# Patient Record
Sex: FEMALE | Race: WHITE
[De-identification: names, ages, dates, MRNs, and addresses within clinical notes are randomized per-mention and may not be internally consistent; named-entity substitution may affect disease eponyms.]

---

## 2019-09-24 ENCOUNTER — HOSPITAL ENCOUNTER (EMERGENCY)
Dept: HOSPITAL 61 - ER | Age: 24
Discharge: HOME | End: 2019-09-24
Payer: COMMERCIAL

## 2019-09-24 VITALS — SYSTOLIC BLOOD PRESSURE: 143 MMHG | DIASTOLIC BLOOD PRESSURE: 72 MMHG

## 2019-09-24 VITALS — HEIGHT: 62 IN | WEIGHT: 240 LBS | BODY MASS INDEX: 44.16 KG/M2

## 2019-09-24 DIAGNOSIS — Z90.49: ICD-10-CM

## 2019-09-24 DIAGNOSIS — N93.8: Primary | ICD-10-CM

## 2019-09-24 LAB
ALBUMIN SERPL-MCNC: 3.9 G/DL (ref 3.4–5)
ALBUMIN/GLOB SERPL: 0.9 {RATIO} (ref 1–1.7)
ALP SERPL-CCNC: 57 U/L (ref 46–116)
ALT SERPL-CCNC: 44 U/L (ref 14–59)
ANION GAP SERPL CALC-SCNC: 9 MMOL/L (ref 6–14)
AST SERPL-CCNC: 26 U/L (ref 15–37)
BASOPHILS # BLD AUTO: 0 X10^3/UL (ref 0–0.2)
BASOPHILS NFR BLD: 1 % (ref 0–3)
BILIRUB SERPL-MCNC: 0.7 MG/DL (ref 0.2–1)
BUN SERPL-MCNC: 12 MG/DL (ref 7–20)
BUN/CREAT SERPL: 15 (ref 6–20)
CALCIUM SERPL-MCNC: 9.6 MG/DL (ref 8.5–10.1)
CHLORIDE SERPL-SCNC: 102 MMOL/L (ref 98–107)
CO2 SERPL-SCNC: 29 MMOL/L (ref 21–32)
CREAT SERPL-MCNC: 0.8 MG/DL (ref 0.6–1)
EOSINOPHIL NFR BLD: 0.2 X10^3/UL (ref 0–0.7)
EOSINOPHIL NFR BLD: 2 % (ref 0–3)
ERYTHROCYTE [DISTWIDTH] IN BLOOD BY AUTOMATED COUNT: 13.1 % (ref 11.5–14.5)
GFR SERPLBLD BASED ON 1.73 SQ M-ARVRAT: 87.4 ML/MIN
GLOBULIN SER-MCNC: 4.2 G/DL (ref 2.2–3.8)
GLUCOSE SERPL-MCNC: 88 MG/DL (ref 70–99)
HCT VFR BLD CALC: 36.6 % (ref 36–47)
HGB BLD-MCNC: 12.7 G/DL (ref 12–15.5)
LYMPHOCYTES # BLD: 2.1 X10^3/UL (ref 1–4.8)
LYMPHOCYTES NFR BLD AUTO: 25 % (ref 24–48)
MCH RBC QN AUTO: 30 PG (ref 25–35)
MCHC RBC AUTO-ENTMCNC: 35 G/DL (ref 31–37)
MCV RBC AUTO: 87 FL (ref 79–100)
MONO #: 0.5 X10^3/UL (ref 0–1.1)
MONOCYTES NFR BLD: 6 % (ref 0–9)
NEUT #: 5.7 X10^3/UL (ref 1.8–7.7)
NEUTROPHILS NFR BLD AUTO: 67 % (ref 31–73)
PLATELET # BLD AUTO: 249 X10^3/UL (ref 140–400)
POTASSIUM SERPL-SCNC: 3.8 MMOL/L (ref 3.5–5.1)
PROT SERPL-MCNC: 8.1 G/DL (ref 6.4–8.2)
RBC # BLD AUTO: 4.23 X10^6/UL (ref 3.5–5.4)
SODIUM SERPL-SCNC: 140 MMOL/L (ref 136–145)
WBC # BLD AUTO: 8.5 X10^3/UL (ref 4–11)

## 2019-09-24 PROCEDURE — 99284 EMERGENCY DEPT VISIT MOD MDM: CPT

## 2019-09-24 PROCEDURE — 84702 CHORIONIC GONADOTROPIN TEST: CPT

## 2019-09-24 PROCEDURE — 80053 COMPREHEN METABOLIC PANEL: CPT

## 2019-09-24 PROCEDURE — 36415 COLL VENOUS BLD VENIPUNCTURE: CPT

## 2019-09-24 PROCEDURE — 81025 URINE PREGNANCY TEST: CPT

## 2019-09-24 PROCEDURE — 85025 COMPLETE CBC W/AUTO DIFF WBC: CPT

## 2019-09-24 PROCEDURE — 86900 BLOOD TYPING SEROLOGIC ABO: CPT

## 2019-09-24 PROCEDURE — 86901 BLOOD TYPING SEROLOGIC RH(D): CPT

## 2019-09-24 NOTE — PHYS DOC
Past Medical History


Past Medical History:  No Pertinent History


Past Surgical History:  Cholecystectomy


Alcohol Use:  None


Drug Use:  None





Adult General


Chief Complaint


Chief Complaint:  VAGINAL BLEEDING





HPI


HPI





Patient is a 24  year old female who presents to the emergency department with 

complaints of vaginal bleeding for the last week. Patient states she had a 

possible faintly positive pregnancy test at home. She states that she does not 

typically have menstrual cycles that she only experiences irregular vaginal spo

tting and occasion. Patient states she has been evaluated by an OB/GYN for this 

and they were unable to Doppler why she does not have menstrual cycles. Patient 

states that she uses condoms for birth control, she denies having unprotected 

intercourse recently. She denies any nausea, vomiting, weight gain, abdominal 

pain, or low back pain. Denies any pain at this time.





Review of Systems


Review of Systems





Constitutional: Denies fever or chills []


Eyes: Denies change in visual acuity, redness, or eye pain []


HENT: Denies nasal congestion or sore throat []


Respiratory: Denies cough or shortness of breath []


Cardiovascular: No additional information not addressed in HPI []


GI: Denies abdominal pain, nausea, vomiting, bloody stools or diarrhea []


: Denies dysuria or hematuria []


Musculoskeletal: Denies back pain or joint pain []


Integument: Denies rash or skin lesions []


Neurologic: Denies headache, focal weakness or sensory changes []


Endocrine: Denies polyuria or polydipsia []





All other systems were reviewed and found to be within normal limits, except as 

documented in this note.





Allergies


Allergies





Allergies








Coded Allergies Type Severity Reaction Last Updated Verified


 


  No Known Drug Allergies    9/24/19 No











Physical Exam


Physical Exam





Constitutional: Well developed, well nourished, no acute distress, non-toxic 

appearance. []


HENT: Normocephalic, atraumatic, bilateral external ears normal, oropharynx 

moist, no oral exudates, nose normal. []


Eyes: PERRLA, EOMI, conjunctiva normal, no discharge. [] 


Neck: Normal range of motion, no tenderness, supple, no stridor. [] 


Cardiovascular:Heart rate regular rhythm, no murmur []


Lungs & Thorax:  Bilateral breath sounds clear to auscultation []


Abdomen: Bowel sounds normal, soft, no tenderness, no masses, no pulsatile mas

ses. [] 


Skin: Warm, dry, no erythema, no rash. [] 


Back: No tenderness, no CVA tenderness. [] 


Extremities: No tenderness, no cyanosis, no clubbing, ROM intact, no edema. [] 


Neurologic: Alert and oriented X 3, normal motor function, normal sensory 

function, no focal deficits noted. []


Psychologic: Affect normal, judgement normal, mood normal. []





Current Patient Data


Vital Signs





                                   Vital Signs








  Date Time  Temp Pulse Resp B/P (MAP) Pulse Ox O2 Delivery O2 Flow Rate FiO2


 


9/24/19 21:25 98.3 97 14 130/90 (103) 93 Room Air  





 98.3       








Lab Values





                                Laboratory Tests








Test


 9/24/19


21:25 9/24/19


21:51


 


White Blood Count


 8.5 x10^3/uL


(4.0-11.0) 





 


Red Blood Count


 4.23 x10^6/uL


(3.50-5.40) 





 


Hemoglobin


 12.7 g/dL


(12.0-15.5) 





 


Hematocrit


 36.6 %


(36.0-47.0) 





 


Mean Corpuscular Volume


 87 fL ()


 





 


Mean Corpuscular Hemoglobin 30 pg (25-35)   


 


Mean Corpuscular Hemoglobin


Concent 35 g/dL


(31-37) 





 


Red Cell Distribution Width


 13.1 %


(11.5-14.5) 





 


Platelet Count


 249 x10^3/uL


(140-400) 





 


Neutrophils (%) (Auto) 67 % (31-73)   


 


Lymphocytes (%) (Auto) 25 % (24-48)   


 


Monocytes (%) (Auto) 6 % (0-9)   


 


Eosinophils (%) (Auto) 2 % (0-3)   


 


Basophils (%) (Auto) 1 % (0-3)   


 


Neutrophils # (Auto)


 5.7 x10^3/uL


(1.8-7.7) 





 


Lymphocytes # (Auto)


 2.1 x10^3/uL


(1.0-4.8) 





 


Monocytes # (Auto)


 0.5 x10^3/uL


(0.0-1.1) 





 


Eosinophils # (Auto)


 0.2 x10^3/uL


(0.0-0.7) 





 


Basophils # (Auto)


 0.0 x10^3/uL


(0.0-0.2) 





 


Maternal Serum HCG Beta


Subunit < 1 mIU/mL


(0-5) 





 


Sodium Level


 140 mmol/L


(136-145) 





 


Potassium Level


 3.8 mmol/L


(3.5-5.1) 





 


Chloride Level


 102 mmol/L


() 





 


Carbon Dioxide Level


 29 mmol/L


(21-32) 





 


Anion Gap 9 (6-14)   


 


Blood Urea Nitrogen


 12 mg/dL


(7-20) 





 


Creatinine


 0.8 mg/dL


(0.6-1.0) 





 


Estimated GFR


(Cockcroft-Gault) 87.4  


 





 


BUN/Creatinine Ratio 15 (6-20)   


 


Glucose Level


 88 mg/dL


(70-99) 





 


Calcium Level


 9.6 mg/dL


(8.5-10.1) 





 


Total Bilirubin


 0.7 mg/dL


(0.2-1.0) 





 


Aspartate Amino Transferase


(AST) 26 U/L (15-37)


 





 


Alanine Aminotransferase (ALT)


 44 U/L (14-59)


 





 


Alkaline Phosphatase


 57 U/L


() 





 


Total Protein


 8.1 g/dL


(6.4-8.2) 





 


Albumin


 3.9 g/dL


(3.4-5.0) 





 


Albumin/Globulin Ratio


 0.9 (1.0-1.7)


L 





 


POC Urine HCG, Qualitative


 


 Hcg negative


(Negative)





                                Laboratory Tests


9/24/19 21:25








                                Laboratory Tests


9/24/19 21:25














EKG


EKG


[]





Radiology/Procedures


Radiology/Procedures


[]





Course & Med Decision Making


Course & Med Decision Making


Pertinent Labs and Imaging studies reviewed. (See chart for details)





[]





Dragon Disclaimer


Dragon Disclaimer


This electronic medical record was generated, in whole or in part, using a voice

 recognition dictation system.





Departure


Departure


Impression:  


   Primary Impression:  


   Dysfunctional uterine bleeding


Disposition:  01 HOME, SELF-CARE


Condition:  STABLE


Referrals:  


NO PCP (PCP)


Patient Instructions:  Uterine Bleeding, Dysfunctional, Easy-to-Read





Additional Instructions:  


Follow up with your OBGyn in 1-2 days for further evaluation of irregular 

periods and vaginal bleeding. Pregnancy testing through urine and blood was 

negative today. Recommend taking 600 mg of ibuprofen every 6 hours as needed for

 pain and to help decrease heaviness of bleeding. Return to the ER if your 

symptoms worsen.


Scripts


Ibuprofen (IBUPROFEN) 600 Mg Tablet


600 MG PO PRN Q6HRS PRN for PAIN for 5 Days, #20 TAB 0 Refills


   Prov: JONNA MILES APRN         9/24/19











JONNA MILES       Sep 24, 2019 22:14

## 2020-01-01 ENCOUNTER — HOSPITAL ENCOUNTER (EMERGENCY)
Dept: HOSPITAL 61 - ER | Age: 25
Discharge: HOME | End: 2020-01-01
Payer: COMMERCIAL

## 2020-01-01 VITALS — HEIGHT: 62 IN | WEIGHT: 250 LBS | BODY MASS INDEX: 46.01 KG/M2

## 2020-01-01 VITALS — DIASTOLIC BLOOD PRESSURE: 84 MMHG | SYSTOLIC BLOOD PRESSURE: 148 MMHG

## 2020-01-01 DIAGNOSIS — J02.0: Primary | ICD-10-CM

## 2020-01-01 DIAGNOSIS — B95.0: ICD-10-CM

## 2020-01-01 PROCEDURE — 87880 STREP A ASSAY W/OPTIC: CPT

## 2020-01-01 PROCEDURE — 99283 EMERGENCY DEPT VISIT LOW MDM: CPT

## 2020-01-01 NOTE — PHYS DOC
Past Medical History


Past Medical History:  No Pertinent History


Past Surgical History:  Cholecystectomy


Alcohol Use:  None


Drug Use:  None





Adult General


Chief Complaint


Chief Complaint:  COUGH





HPI


HPI





Patient is a 24  year old female who presents to emergency department with 

complaints of a sore throat, bilateral ear pain, and a cough with green sputum 

produced the last few days. Patient states her son was diagnosed with strep last

week. She denies any fever, shortness of breath or wheezing, nausea, diarrhea, 

or abdominal pain. Patient states that last night she was coughing so hard that 

she vomited once. She currently rates her pain 8 out of 10 on the pain scale, 

she denies any alleviating factors. Patient states that if she is prescribed an 

antibiotic she will need a prescription for Diflucan as she frequently has a 

yeast infection after taking antibiotics. All other ROS is neg unless otherwise 

noted in HPI.





Review of Systems


Review of Systems


See Above





Allergies


Allergies





Allergies








Coded Allergies Type Severity Reaction Last Updated Verified


 


  No Known Drug Allergies    9/24/19 No











Physical Exam


Physical Exam


See Above


Constitutional: Well developed, well nourished, no acute distress, non-toxic 

appearance, obese. []


HENT: Normocephalic, atraumatic, bilateral external ears normal, bilateral TMs 

normal, 2+ tonsils bilaterally that are erythemic with pustules present, 

oropharynx moist,  nose normal. []


Eyes: PERRLA, EOMI, conjunctiva normal, no discharge. [] 


Neck: Normal range of motion, bilateral anterior cervical chain lymph node 

enlargement and tenderness, no stridor. [] 


Cardiovascular:Heart rate regular rhythm, no murmur []


Lungs & Thorax:  Bilateral breath sounds clear to auscultation, Respirations 

even and unlabored, no retractions, no respiratory distress []


Skin: Warm, dry, no erythema, no rash. [] 


Extremities: No cyanosis, ROM intact, no edema. [] 


Neurologic: Alert and oriented X 3,  no focal deficits noted. []


Psychologic: Affect normal, judgement normal, mood normal. []





Current Patient Data


Vital Signs





                                   Vital Signs








  Date Time  Temp Pulse Resp B/P (MAP) Pulse Ox O2 Delivery O2 Flow Rate FiO2


 


1/1/20 12:43 98.6 114 16 148/84 (105) 97 Room Air  





 98.6       








Lab Values





                                Laboratory Tests








Test


 1/1/20


12:40


 


Group A Streptococcus Rapid


 Positive


(NEGATIVE)











EKG


EKG


[]





Radiology/Procedures


Radiology/Procedures


Rapid strep positive[]





Course & Med Decision Making


Course & Med Decision Making


Pertinent Labs and Imaging studies reviewed. (See chart for details)





[]





Dragon Disclaimer


Dragon Disclaimer


This electronic medical record was generated, in whole or in part, using a voice

 recognition dictation system.





Departure


Departure


Impression:  


   Primary Impression:  


   Strep pharyngitis


   Additional Impression:  


   URI with cough and congestion


Disposition:  01 HOME, SELF-CARE


Condition:  STABLE


Referrals:  


NO PCP (PCP)


Patient Instructions:  Strep Throat, Easy-to-Read, Upper Respiratory Infection, 

Adult, Easy-to-Read





Additional Instructions:  


Fill prescription and use as directed. Recommend warm salt water gargles as 

needed for relief of discomfort. Alternate Tylenol and ibuprofen as needed for 

fever/pain. Discard your toothbrush tomorrow and begin using a new toothbrush.  

Recommend use of a Cool mist humidifier in room at bedtime.  Increase clear 

fluids. Avoid airway triggers such as smoke, fragrance, dust, and pollen.  

Follow-up with your primary care doctor if symptoms persist, return to the ER if

 symptoms worsen.


Scripts


Fluconazole (DIFLUCAN) 150 Mg Tablet


1 TAB PO ONCE, #1 TAB 1 Refill


   may repeat in 72 hours if symptoms persist


   Prov: JONNA MILES APRN         1/1/20 


Benzonatate (TESSALON PERLE) 100 Mg Capsule


1 CAP PO TID PRN for COUGH for 7 Days, #21 CAP 0 Refills


   Prov: JONNA MILES APRN         1/1/20 


Azithromycin (AZITHROMYCIN TABLET) 250 Mg Tablet


1 PKG PO UD for 5 Days, #6 TAB 0 Refills


   2 the first day followed by 1 for days 2-5


   Prov: JONNA MILES APRN         1/1/20





Problem Qualifiers











JONNA MILES APRN        Jan 1, 2020 13:11

## 2020-02-21 ENCOUNTER — HOSPITAL ENCOUNTER (EMERGENCY)
Dept: HOSPITAL 61 - ER | Age: 25
Discharge: HOME | End: 2020-02-21
Payer: COMMERCIAL

## 2020-02-21 VITALS — HEIGHT: 64 IN | BODY MASS INDEX: 39.33 KG/M2 | WEIGHT: 230.38 LBS

## 2020-02-21 VITALS — SYSTOLIC BLOOD PRESSURE: 154 MMHG | DIASTOLIC BLOOD PRESSURE: 104 MMHG

## 2020-02-21 DIAGNOSIS — L03.011: Primary | ICD-10-CM

## 2020-02-21 PROCEDURE — 99283 EMERGENCY DEPT VISIT LOW MDM: CPT

## 2020-02-21 NOTE — PHYS DOC
Past Medical History


Past Medical History:  No Pertinent History


Past Surgical History:  Cholecystectomy


Smoking Status:  Never Smoker


Alcohol Use:  None


Drug Use:  None





Adult General


Chief Complaint


Chief Complaint:  FINGER INJURY





HPI


HPI





Patient is a 24  year old female who presents to the ER wt complaints of R 

index finger pain and swelling after a injury that caused her r index fingernail

to lift from the nailbed 2 weeks ago. Pt states the nail drained bloody pus from

it tonight. She denies any fever, numbness, tingling, or decreased ROM of the 

affected finger. She currently rates her pain a 10/10 on the pain scale, she 

denies any alleviating factors.





Review of Systems


Review of Systems





All other systems were reviewed and found to be within normal limits, except as 

documented in this note.





Allergies


Allergies





Allergies








Coded Allergies Type Severity Reaction Last Updated Verified


 


  No Known Drug Allergies    9/24/19 No











Physical Exam


Physical Exam





Constitutional: Well developed, well nourished, no acute distress, non-toxic 

appearance., obese []


HENT: Normocephalic, atraumatic, bilateral external ears normal, oropharynx 

moist, no oral exudates, nose normal. []


Eyes: PERRLA, EOMI, conjunctiva normal, no discharge. [] 


Neck: Normal range of motion, no stridor. [] 


Cardiovascular:Heart rate regular rhythm


Lungs & Thorax:  Respirations even and unlabored, no retractions, no respiratory

 distress


Skin: Warm, dry, no rash; erythema and warmth distal to the DIP of the right 

index finger consistent with cellulitis


Extremities: No cyanosis, ROM intact


Neurologic: Alert and oriented X 3, normal motor function, normal sensory fun

ction, no focal deficits noted. []


Psychologic: Affect normal, judgement normal, mood normal. []





Current Patient Data


Vital Signs





                                   Vital Signs








  Date Time  Temp Pulse Resp B/P (MAP) Pulse Ox O2 Delivery O2 Flow Rate FiO2


 


2/21/20 22:35 98.6 91 16 154/104 (121) 98 Room Air  





 98.6       











EKG


EKG


[]





Radiology/Procedures


Radiology/Procedures


[]





Course & Med Decision Making


Course & Med Decision Making


Pertinent Labs and Imaging studies reviewed. (See chart for details)





[]





Dragon Disclaimer


Dragon Disclaimer


This electronic medical record was generated, in whole or in part, using a voice

 recognition dictation system.





Departure


Departure


Impression:  


   Primary Impression:  


   Cellulitis of right index finger


Disposition:  01 HOME, SELF-CARE


Condition:  STABLE


Referrals:  


NO PCP (PCP)


Patient Instructions:  Cellulitis, Easy-to-Read





Additional Instructions:  


Fill the prescription and use it as directed. Recommend warm Epson salt soaks 3 

times a day and as needed for discomfort. You may take Tylenol or ibuprofen as 

needed for pain. Follow-up with your primary care doctor if symptoms persist, 

return to the ER symptoms worsen or he developed a fever.


Scripts


Bacitracin/Polymyxin B Sulfate (POLYSPORIN TOPICAL OINT) 28.3 Gm Oint...g.


1 GAYLE TP BID for WOUND CARE for 7 Days, #1 TUBE 0 Refills


   AS DIRECTED BY PHYSICIAN


   Prov: JONNA MILES         2/21/20











JONNA MILES       Feb 21, 2020 23:10

## 2020-07-14 ENCOUNTER — HOSPITAL ENCOUNTER (EMERGENCY)
Dept: HOSPITAL 61 - ER | Age: 25
Discharge: HOME | End: 2020-07-14
Payer: COMMERCIAL

## 2020-07-14 VITALS — WEIGHT: 250.45 LBS | HEIGHT: 62 IN | BODY MASS INDEX: 46.09 KG/M2

## 2020-07-14 VITALS — DIASTOLIC BLOOD PRESSURE: 86 MMHG | SYSTOLIC BLOOD PRESSURE: 143 MMHG

## 2020-07-14 DIAGNOSIS — N93.8: Primary | ICD-10-CM

## 2020-07-14 DIAGNOSIS — Z90.49: ICD-10-CM

## 2020-07-14 LAB
BASOPHILS # BLD AUTO: 0 X10^3/UL (ref 0–0.2)
BASOPHILS NFR BLD: 1 % (ref 0–3)
EOSINOPHIL NFR BLD: 0.1 X10^3/UL (ref 0–0.7)
EOSINOPHIL NFR BLD: 1 % (ref 0–3)
ERYTHROCYTE [DISTWIDTH] IN BLOOD BY AUTOMATED COUNT: 13.3 % (ref 11.5–14.5)
HCT VFR BLD CALC: 36.1 % (ref 36–47)
HGB BLD-MCNC: 12.9 G/DL (ref 12–15.5)
LYMPHOCYTES # BLD: 2.1 X10^3/UL (ref 1–4.8)
LYMPHOCYTES NFR BLD AUTO: 24 % (ref 24–48)
MCH RBC QN AUTO: 31 PG (ref 25–35)
MCHC RBC AUTO-ENTMCNC: 36 G/DL (ref 31–37)
MCV RBC AUTO: 86 FL (ref 79–100)
MONO #: 0.4 X10^3/UL (ref 0–1.1)
MONOCYTES NFR BLD: 4 % (ref 0–9)
NEUT #: 6.2 X10^3/UL (ref 1.8–7.7)
NEUTROPHILS NFR BLD AUTO: 70 % (ref 31–73)
PLATELET # BLD AUTO: 272 X10^3/UL (ref 140–400)
RBC # BLD AUTO: 4.22 X10^6/UL (ref 3.5–5.4)
WBC # BLD AUTO: 8.8 X10^3/UL (ref 4–11)

## 2020-07-14 PROCEDURE — 99284 EMERGENCY DEPT VISIT MOD MDM: CPT

## 2020-07-14 PROCEDURE — 85025 COMPLETE CBC W/AUTO DIFF WBC: CPT

## 2020-07-14 PROCEDURE — 36415 COLL VENOUS BLD VENIPUNCTURE: CPT

## 2020-07-14 NOTE — PHYS DOC
Past Medical History


Past Medical History:  No Pertinent History


Past Surgical History:  Cholecystectomy


Smoking Status:  Never Smoker


Alcohol Use:  None


Drug Use:  None





General Adult


EDM:


Chief Complaint:  VAGINAL PROBLEM





HPI:


HPI:





Patient is a 25  year old female who presents to the emergency department with 

complaints of vaginal bleeding.  She reports that she has been having light 

vaginal bleeding since April of this year.  She states that the bleeding had 

stopped on July 3 of 2020, but then again on July 10 of 2020 she developed 

heavier vaginal bleeding.  She states she has been using about 4 pads a day.  

She denies any irregular vaginal discharge, vaginal odor, pelvic pain, dysuria, 

hematuria, or increased urinary frequency.  She denies any concern for sexually 

transmitted infection.  Patient reports history of PCOS and states until April 

she had not had a period in over a year.  She denies any fever, dizziness, or 

syncope.  Patient states she has been taking Midol as needed for cramps.  She 

currently denies any pain.





Review of Systems:


Review of Systems:


Complete ROS is negative unless otherwise stated in the HPI.





Heart Score:


Risk Factors:


Risk Factors:  DM, Current or recent (<one month) smoker, HTN, HLP, family 

history of CAD, obesity.


Risk Scores:


Score 0 - 3:  2.5% MACE over next 6 weeks - Discharge Home


Score 4 - 6:  20.3% MACE over next 6 weeks - Admit for Clinical Observation


Score 7 - 10:  72.7% MACE over next 6 weeks - Early Invasive Strategies





Allergies:


Allergies:





Allergies








Coded Allergies Type Severity Reaction Last Updated Verified


 


  No Known Drug Allergies    9/24/19 No











Physical Exam:


PE:





Constitutional: Well developed, well nourished, no acute distress, non-toxic 

appearance, obese. 


HENT: Normocephalic, atraumatic, bilateral external ears normal, nose normal. 


Eyes: PERRLA, EOMI, conjunctiva normal, no discharge.


Neck: Normal range of motion, no stridor.


Cardiovascular: Heart rate regular rhythm


Lungs & Thorax:  Respirations even and unlabored, no retractions, no respiratory

distress


Pelvic Exam: Chaperone present Key RN


 Abdomen:      Nontender, soft


 External Genitalia:   Normal Skin


 Speculum:      Normal vaginal mucosa, bloody cervical discharge


 Bimanual:       No adnexal masses or tenderness, No CMT


Skin: Warm, dry, no erythema, no rash. 


Extremities: No cyanosis, ROM intact, no edema. 


Neurologic: Alert and oriented X 3, no focal deficits noted. 


Psychologic: Affect normal, judgement normal, mood normal.





Current Patient Data:


Vital Signs:





                                   Vital Signs








  Date Time  Temp Pulse Resp B/P (MAP) Pulse Ox O2 Delivery O2 Flow Rate FiO2


 


7/14/20 16:56 98.0 74 16 154/72 (99) 97 Room Air  





 98.0       











EKG:


EKG:


[]





Radiology/Procedures:


Radiology/Procedures:


[]





Course & Med Decision Making:


Course & Med Decision Making


Pertinent Labs and Imaging studies reviewed. (See chart for details)


25-year-old female presents to the emergency department with complaints of va

ginal bleeding.


CBC is unremarkable.


The patient refused wet mount and testing for gonorrhea and chlamydia.  Pelvic 

exam reveals a moderate amount of bloody discharge from the cervix consistent 

with menstrual flow.


I encouraged the patient to take ibuprofen 600 to 800 mg every 6 hours as needed

 for pain and also to help to reduce the bleeding.  Provided her with 

information for Dr. Galan's office to follow-up about her irregular and 

dysfunctional uterine bleeding.





Patient verbalized an understanding of home care, medications, follow-up, and 

return to ED instructions and was in agreement with the plan of care.


[]





Dragon Disclaimer:


Dragon Disclaimer:


This electronic medical record was generated, in whole or in part, using a voice

 recognition dictation system.





Departure


Departure


Impression:  


   Primary Impression:  


   Dysfunctional uterine bleeding


Disposition:  01 HOME, SELF-CARE


Condition:  STABLE


Referrals:  


JEANE SILVESTRE MD


Patient Instructions:  Uterine Bleeding, Dysfunctional, Easy-to-Read





Additional Instructions:  


Recommend taking ibuprofen 600 to 800 mg every 6-8 hours as needed for cramping 

and to help lighten your menstrual flow.  Follow-up with Dr. Galan's office for 

further evaluation of your abnormal vaginal return to the ER if your symptoms 

worsen





Justicifation of Admission Dx:


Justifications for Admission:


Justification of Admission Dx:  N/A











JONNA MILES APRN       Jul 14, 2020 17:43

## 2020-11-20 ENCOUNTER — HOSPITAL ENCOUNTER (EMERGENCY)
Dept: HOSPITAL 61 - ER | Age: 25
Discharge: HOME | End: 2020-11-20
Payer: COMMERCIAL

## 2020-11-20 VITALS — DIASTOLIC BLOOD PRESSURE: 77 MMHG | SYSTOLIC BLOOD PRESSURE: 121 MMHG

## 2020-11-20 VITALS — HEIGHT: 62 IN | BODY MASS INDEX: 44.99 KG/M2 | WEIGHT: 244.49 LBS

## 2020-11-20 DIAGNOSIS — N13.2: Primary | ICD-10-CM

## 2020-11-20 DIAGNOSIS — Z90.49: ICD-10-CM

## 2020-11-20 LAB
ALBUMIN SERPL-MCNC: 3.6 G/DL (ref 3.4–5)
ALBUMIN/GLOB SERPL: 0.9 {RATIO} (ref 1–1.7)
ALP SERPL-CCNC: 51 U/L (ref 46–116)
ALT SERPL-CCNC: 58 U/L (ref 14–59)
ANION GAP SERPL CALC-SCNC: 11 MMOL/L (ref 6–14)
APTT PPP: YELLOW S
AST SERPL-CCNC: 31 U/L (ref 15–37)
BACTERIA #/AREA URNS HPF: (no result) /HPF
BASOPHILS # BLD AUTO: 0.1 X10^3/UL (ref 0–0.2)
BASOPHILS NFR BLD: 1 % (ref 0–3)
BILIRUB SERPL-MCNC: 0.5 MG/DL (ref 0.2–1)
BILIRUB UR QL STRIP: NEGATIVE
BUN SERPL-MCNC: 13 MG/DL (ref 7–20)
BUN/CREAT SERPL: 16 (ref 6–20)
CALCIUM SERPL-MCNC: 9.1 MG/DL (ref 8.5–10.1)
CHLORIDE SERPL-SCNC: 102 MMOL/L (ref 98–107)
CO2 SERPL-SCNC: 26 MMOL/L (ref 21–32)
CREAT SERPL-MCNC: 0.8 MG/DL (ref 0.6–1)
EOSINOPHIL NFR BLD: 0.2 X10^3/UL (ref 0–0.7)
EOSINOPHIL NFR BLD: 2 % (ref 0–3)
ERYTHROCYTE [DISTWIDTH] IN BLOOD BY AUTOMATED COUNT: 12.8 % (ref 11.5–14.5)
FIBRINOGEN PPP-MCNC: CLEAR MG/DL
GFR SERPLBLD BASED ON 1.73 SQ M-ARVRAT: 86.7 ML/MIN
GLUCOSE SERPL-MCNC: 118 MG/DL (ref 70–99)
HCT VFR BLD CALC: 34.7 % (ref 36–47)
HGB BLD-MCNC: 11.8 G/DL (ref 12–15.5)
LYMPHOCYTES # BLD: 2.5 X10^3/UL (ref 1–4.8)
LYMPHOCYTES NFR BLD AUTO: 30 % (ref 24–48)
MCH RBC QN AUTO: 29 PG (ref 25–35)
MCHC RBC AUTO-ENTMCNC: 34 G/DL (ref 31–37)
MCV RBC AUTO: 85 FL (ref 79–100)
MONO #: 0.6 X10^3/UL (ref 0–1.1)
MONOCYTES NFR BLD: 7 % (ref 0–9)
NEUT #: 5.2 X10^3/UL (ref 1.8–7.7)
NEUTROPHILS NFR BLD AUTO: 61 % (ref 31–73)
NITRITE UR QL STRIP: NEGATIVE
PH UR STRIP: 5.5 [PH]
PLATELET # BLD AUTO: 250 X10^3/UL (ref 140–400)
POTASSIUM SERPL-SCNC: 3.6 MMOL/L (ref 3.5–5.1)
PROT SERPL-MCNC: 7.6 G/DL (ref 6.4–8.2)
PROT UR STRIP-MCNC: NEGATIVE MG/DL
RBC # BLD AUTO: 4.06 X10^6/UL (ref 3.5–5.4)
RBC #/AREA URNS HPF: (no result) /HPF (ref 0–2)
SODIUM SERPL-SCNC: 139 MMOL/L (ref 136–145)
UROBILINOGEN UR-MCNC: 0.2 MG/DL
WBC # BLD AUTO: 8.5 X10^3/UL (ref 4–11)
WBC #/AREA URNS HPF: (no result) /HPF (ref 0–4)

## 2020-11-20 PROCEDURE — 96374 THER/PROPH/DIAG INJ IV PUSH: CPT

## 2020-11-20 PROCEDURE — 85025 COMPLETE CBC W/AUTO DIFF WBC: CPT

## 2020-11-20 PROCEDURE — 99284 EMERGENCY DEPT VISIT MOD MDM: CPT

## 2020-11-20 PROCEDURE — 74176 CT ABD & PELVIS W/O CONTRAST: CPT

## 2020-11-20 PROCEDURE — 36415 COLL VENOUS BLD VENIPUNCTURE: CPT

## 2020-11-20 PROCEDURE — 87086 URINE CULTURE/COLONY COUNT: CPT

## 2020-11-20 PROCEDURE — 81025 URINE PREGNANCY TEST: CPT

## 2020-11-20 PROCEDURE — 81001 URINALYSIS AUTO W/SCOPE: CPT

## 2020-11-20 PROCEDURE — 96361 HYDRATE IV INFUSION ADD-ON: CPT

## 2020-11-20 PROCEDURE — 80053 COMPREHEN METABOLIC PANEL: CPT

## 2020-11-20 PROCEDURE — 96375 TX/PRO/DX INJ NEW DRUG ADDON: CPT

## 2020-11-20 NOTE — RAD
Abdominal and Pelvis CT, Without Contrast:

 

History:  Reason: flank pain abd pain / Spl. Instructions:  / History: 

 

Comparison: None.

 

Procedure: Axial images are obtained of the abdomen and pelvis, without IV

or oral contrast.

 

Oral Contrast:  No

 

Findings:

 

Evaluation of solid organs is limited without contrast.

 

Liver: Normal.

 

Spleen: Normal.

 

Pancreas: Normal.

 

 

Adrenal Glands: Normal.

 

Kidneys: There 2 nonobstructive punctate stones in the left renal pelvis. 

There is mild left hydronephrosis and left hydroureter secondary to a 4 mm

stone in the proximal ureter.

 

There is been prior cholecystectomy. The appendix is normal.

 

There is no free air or free fluid.

 

There is no lymphadenopathy.

 

 

The urinary bladder partially collapsed. Mild wall thickening is likely 

secondary to hypertrophy.

 

There is no pericolonic inflammation identified.

 

Impression:

 

Mild left hydronephrosis and left hydroureter secondary to a 4 mm stone in

the proximal left ureter.

 

End impression

 

PQRS Compliance Statement:

 

One or more of the following individualized dose reduction techniques were

utilized for this examination:  

1. Automated exposure control  

2. Adjustment of the mA and/or kV according to patient size  

3. Use of iterative reconstruction technique

 

Electronically signed by: Bartolo Hadley III, MD (11/20/2020 3:50 AM) 

[facility]

## 2020-11-20 NOTE — PHYS DOC
Past Medical History


Past Medical History:  No Pertinent History


Past Surgical History:  Cholecystectomy


Smoking Status:  Never Smoker


Alcohol Use:  None


Drug Use:  None





General Adult


EDM:


Chief Complaint:  ABDOMINAL PAIN





HPI:


HPI:





Patient is a 25  year old female who presents with abdominal pain.





Patient reports excrutiating abdominal pain began around 1100 on 11/19/2020. 

Patient took a hot shower and the pain resolved for about an hour then came back

with the same intensity. Patient took a second shower with pain resolution and 

then went to sleep. She awoke in severe pain and decided to come to the ED. 

Patient took ibuprofen, tylenol, and hydrocone with no resolution of pain. Pain 

is located in the left lower quadrant and radiates around her left flank to her 

left lumbar region. The area is tender to palpation with no rebound tenderness. 

No erythema or ecchymosis is visible. Patient has never had pain like this 

before and is in obvious discomfort.





Review of Systems:


Review of Systems:


Constitutional:   Denies fever or chills. []


HENT:   Denies nasal congestion or sore throat. [] 


Respiratory:   Denies cough or shortness of breath. [] 


Cardiovascular:   Denies chest pain or edema. [] 


GI:   Denies nausea, bloody stools. [Positive for abdominal pain, nausea, and 

diarrhea] 


:  Denies dysuria. [] 


Musculoskeletal:    Denies joint pain. [Positive for back pain] 


Integument:   Denies rash. [] 


Neurologic:   Denies headache, focal weakness or sensory changes. [] 


Endocrine:   Denies polyuria or polydipsia. []





Heart Score:


Risk Factors:


Risk Factors:  DM, Current or recent (<one month) smoker, HTN, HLP, family 

history of CAD, obesity.


Risk Scores:


Score 0 - 3:  2.5% MACE over next 6 weeks - Discharge Home


Score 4 - 6:  20.3% MACE over next 6 weeks - Admit for Clinical Observation


Score 7 - 10:  72.7% MACE over next 6 weeks - Early Invasive Strategies





Current Medications:





Current Medications








 Medications


  (Trade)  Dose


 Ordered  Sig/Florence  Start Time


 Stop Time Status Last Admin


Dose Admin


 


 Ketorolac


 Tromethamine


  (Toradol 30mg


 Vial)  30 mg  1X  ONCE  11/20/20 04:00


 11/20/20 04:01   





 


 Sodium Chloride  1,000 ml @ 


 1,000 mls/hr  1X  ONCE  11/20/20 04:00


 11/20/20 04:59   














Allergies:


Allergies:





Allergies








Coded Allergies Type Severity Reaction Last Updated Verified


 


  No Known Drug Allergies    9/24/19 No











Physical Exam:


PE:





Constitutional: Well developed, well nourished, no acute distress, non-toxic 

appearance. []


HENT: Normocephalic, atraumatic, bilateral external ears normal, oropharynx 

moist, no oral exudates, nose normal. []


Eyes: PERRLA, EOMI, conjunctiva normal, no discharge. [] 


Neck: Normal range of motion, no tenderness, supple, no stridor. [] 


Cardiovascular:Heart rate regular rhythm, no murmur []


Lungs & Thorax:  Bilateral breath sounds clear to auscultation []


Abdomen: Bowel sounds normal, soft, no tenderness, no masses, no pulsatile 

masses. [] 


Skin: Warm, dry, no erythema, no rash. [] 


Back: No tenderness, no CVA tenderness. [] 


Extremities: No tenderness, no cyanosis, no clubbing, ROM intact, no edema. [] 


Neurologic: Alert and oriented X 3, normal motor function, normal sensory 

function, no focal deficits noted. []


Psychologic: Affect normal, judgement normal, mood normal. []





Current Patient Data:


Labs:





                                Laboratory Tests








Test


 11/20/20


02:50 11/20/20


02:53


 


Urine Collection Type Unknown   


 


Urine Color Yellow   


 


Urine Clarity Clear   


 


Urine pH


 5.5 (<5.0-8.0)


 





 


Urine Specific Gravity


 1.025


(1.000-1.030) 





 


Urine Protein


 Negative mg/dL


(NEG-TRACE) 





 


Urine Glucose (UA)


 Negative mg/dL


(NEG) 





 


Urine Ketones (Stick)


 Negative mg/dL


(NEG) 





 


Urine Blood Large (NEG)   


 


Urine Nitrite


 Negative (NEG)


 





 


Urine Bilirubin


 Negative (NEG)


 





 


Urine Urobilinogen Dipstick


 0.2 mg/dL (0.2


mg/dL) 





 


Urine Leukocyte Esterase Small (NEG)   


 


Urine RBC


 Tntc /HPF


(0-2) 





 


Urine WBC


 5-10 /HPF


(0-4) 





 


Urine Squamous Epithelial


Cells Mod /LPF  


 





 


Urine Bacteria


 Few /HPF


(0-FEW) 





 


Urine Mucus Mod /LPF   


 


POC Urine HCG, Qualitative


 


 Hcg negative


(Negative)








Vital Signs:





                                   Vital Signs








  Date Time  Temp Pulse Resp B/P (MAP) Pulse Ox O2 Delivery O2 Flow Rate FiO2


 


11/20/20 03:05 98.1 84 18 144/84 (104) 100 Room Air  





 98.1       











EKG:


EKG:


[]





Radiology/Procedures:


Radiology/Procedures:


[]





Course & Med Decision Making:


Course & Med Decision Making


Pertinent Labs and Imaging studies reviewed. (See chart for details)





[] Patient was evaluated for chief complaint.  Work-up consisted of laboratory 

analysis and radiologic imaging.  Results reviewed and discussed with patient.  

Patient with a 4 mm stone left proximal ureter with associated hydroureter.  

Patient's pain was treated with Toradol with improvement.  Patient received a 

dose of Flomax.  Patient was discharged home with Flomax and Percocet.  Patient 

was given follow-up information for Dr. Schmidt urology.





Dragon Disclaimer:


Dragon Disclaimer:


This electronic medical record was generated, in whole or in part, using a voice

 recognition dictation system.





Departure


Departure


Impression:  


   Primary Impression:  


   Kidney stone


Disposition:  01 DC HOME SELF CARE/HOMELESS


Condition:  STABLE


Referrals:  


NO PCP (PCP)


Patient Instructions:  Kidney Stones





Additional Instructions:  


Dr Schmidt, Fabio


[phone] [phone]


Scripts


Oxycodone HCl/Acetaminophen (Percocet 5-325 mg Tablet) 1 Each Tablet


1 TAB PO QIDPRN PRN for PAIN MDD 4 Tablet(s) for 5 Days, #20 TAB 0 Refills


   Prov: PATRICK ROMERO DO         11/20/20 


Tamsulosin Hcl (FLOMAX) 0.4 Mg Cap.er.24h


0.4 MG PO DAILY for 14 Days, #14 TAB


   Prov: PATRICK ROMERO DO         11/20/20











PATRICK ROMERO DO            Nov 20, 2020 03:50

## 2020-11-22 ENCOUNTER — HOSPITAL ENCOUNTER (EMERGENCY)
Dept: HOSPITAL 61 - ER | Age: 25
Discharge: HOME | End: 2020-11-22
Payer: COMMERCIAL

## 2020-11-22 VITALS — DIASTOLIC BLOOD PRESSURE: 69 MMHG | SYSTOLIC BLOOD PRESSURE: 124 MMHG

## 2020-11-22 VITALS — WEIGHT: 264.55 LBS | BODY MASS INDEX: 48.68 KG/M2 | HEIGHT: 62 IN

## 2020-11-22 DIAGNOSIS — Z90.49: ICD-10-CM

## 2020-11-22 DIAGNOSIS — N20.1: Primary | ICD-10-CM

## 2020-11-22 DIAGNOSIS — M79.605: ICD-10-CM

## 2020-11-22 DIAGNOSIS — R10.32: ICD-10-CM

## 2020-11-22 LAB
ANION GAP SERPL CALC-SCNC: 9 MMOL/L (ref 6–14)
APTT PPP: YELLOW S
BACTERIA #/AREA URNS HPF: (no result) /HPF
BASOPHILS # BLD AUTO: 0 X10^3/UL (ref 0–0.2)
BASOPHILS NFR BLD: 0 % (ref 0–3)
BILIRUB UR QL STRIP: NEGATIVE
BUN SERPL-MCNC: 13 MG/DL (ref 7–20)
CALCIUM SERPL-MCNC: 9.6 MG/DL (ref 8.5–10.1)
CHLORIDE SERPL-SCNC: 102 MMOL/L (ref 98–107)
CO2 SERPL-SCNC: 27 MMOL/L (ref 21–32)
CREAT SERPL-MCNC: 1 MG/DL (ref 0.6–1)
EOSINOPHIL NFR BLD: 0.2 X10^3/UL (ref 0–0.7)
EOSINOPHIL NFR BLD: 2 % (ref 0–3)
ERYTHROCYTE [DISTWIDTH] IN BLOOD BY AUTOMATED COUNT: 13.1 % (ref 11.5–14.5)
FIBRINOGEN PPP-MCNC: CLEAR MG/DL
GFR SERPLBLD BASED ON 1.73 SQ M-ARVRAT: 67 ML/MIN
GLUCOSE SERPL-MCNC: 97 MG/DL (ref 70–99)
HCT VFR BLD CALC: 34.1 % (ref 36–47)
HGB BLD-MCNC: 11.9 G/DL (ref 12–15.5)
LYMPHOCYTES # BLD: 2.2 X10^3/UL (ref 1–4.8)
LYMPHOCYTES NFR BLD AUTO: 30 % (ref 24–48)
MCH RBC QN AUTO: 30 PG (ref 25–35)
MCHC RBC AUTO-ENTMCNC: 35 G/DL (ref 31–37)
MCV RBC AUTO: 85 FL (ref 79–100)
MONO #: 0.5 X10^3/UL (ref 0–1.1)
MONOCYTES NFR BLD: 7 % (ref 0–9)
NEUT #: 4.4 X10^3/UL (ref 1.8–7.7)
NEUTROPHILS NFR BLD AUTO: 60 % (ref 31–73)
NITRITE UR QL STRIP: NEGATIVE
PH UR STRIP: 5.5 [PH]
PLATELET # BLD AUTO: 237 X10^3/UL (ref 140–400)
POTASSIUM SERPL-SCNC: 3.5 MMOL/L (ref 3.5–5.1)
PROT UR STRIP-MCNC: NEGATIVE MG/DL
RBC # BLD AUTO: 4.03 X10^6/UL (ref 3.5–5.4)
RBC #/AREA URNS HPF: (no result) /HPF (ref 0–2)
SODIUM SERPL-SCNC: 138 MMOL/L (ref 136–145)
UROBILINOGEN UR-MCNC: 0.2 MG/DL
WBC # BLD AUTO: 7.3 X10^3/UL (ref 4–11)
WBC #/AREA URNS HPF: (no result) /HPF (ref 0–4)

## 2020-11-22 PROCEDURE — 87086 URINE CULTURE/COLONY COUNT: CPT

## 2020-11-22 PROCEDURE — 99284 EMERGENCY DEPT VISIT MOD MDM: CPT

## 2020-11-22 PROCEDURE — 81025 URINE PREGNANCY TEST: CPT

## 2020-11-22 PROCEDURE — 81001 URINALYSIS AUTO W/SCOPE: CPT

## 2020-11-22 PROCEDURE — 36415 COLL VENOUS BLD VENIPUNCTURE: CPT

## 2020-11-22 PROCEDURE — 85025 COMPLETE CBC W/AUTO DIFF WBC: CPT

## 2020-11-22 PROCEDURE — 96374 THER/PROPH/DIAG INJ IV PUSH: CPT

## 2020-11-22 PROCEDURE — 96375 TX/PRO/DX INJ NEW DRUG ADDON: CPT

## 2020-11-22 PROCEDURE — 80048 BASIC METABOLIC PNL TOTAL CA: CPT

## 2020-11-22 NOTE — PHYS DOC
Past Medical History


Past Medical History:  No Pertinent History


Past Surgical History:  Cholecystectomy


Smoking Status:  Never Smoker


Alcohol Use:  None


Drug Use:  None





General Adult


EDM:


Chief Complaint:  FLANK PAIN





HPI:


HPI:





Patient is a 25  year old 25-year-old female who presents with left flank pain. 

Patient was here 2 days ago and diagnosed with a 4 mm proximal left ureteral 

stone.  Patient states overnight the pain was unable to be controlled with pain 

meds that she was given.  Patient denies any fever but has had vomiting this 

morning.  Patient describes severe, 10 out of 10 sharp stabbing left flank pain 

that radiates to the left lower quadrant.





Review of Systems:


Review of Systems:


Constitutional:   Denies fever or chills. []


Eyes:   Denies change in visual acuity. []


HENT:   Denies nasal congestion or sore throat. [] 


Respiratory:   Denies cough or shortness of breath. [] 


Cardiovascular:   Denies chest pain or edema. [] 


GI:   Complains abdominal pain, nausea, vomiting but no diarrhea.


:  Denies dysuria. [] 


Musculoskeletal:   Complains of left leg pain


Integument:   Denies rash. [] 


Neurologic:   Denies headache, focal weakness or sensory changes. [] 


Endocrine:   Denies polyuria or polydipsia. [] 


Lymphatic:  Denies swollen glands. [] 


Psychiatric:  Denies depression or anxiety. []





Heart Score:


Risk Factors:


Risk Factors:  DM, Current or recent (<one month) smoker, HTN, HLP, family 

history of CAD, obesity.


Risk Scores:


Score 0 - 3:  2.5% MACE over next 6 weeks - Discharge Home


Score 4 - 6:  20.3% MACE over next 6 weeks - Admit for Clinical Observation


Score 7 - 10:  72.7% MACE over next 6 weeks - Early Invasive Strategies





Current Medications:





Current Medications








 Medications


  (Trade)  Dose


 Ordered  Sig/Florence  Start Time


 Stop Time Status Last Admin


Dose Admin


 


 Ketorolac


 Tromethamine


  (Toradol 15mg


 Vial)  15 mg  1X  ONCE  11/22/20 08:45


 11/22/20 08:46 UNV  





 


 Morphine Sulfate


  (Morphine


 Sulfate)  4 mg  1X  ONCE  11/22/20 08:45


 11/22/20 08:46 UNV  





 


 Ondansetron HCl


  (Zofran)  4 mg  1X  ONCE  11/22/20 08:45


 11/22/20 08:46 UNV  














Allergies:


Allergies:





Allergies








Coded Allergies Type Severity Reaction Last Updated Verified


 


  No Known Drug Allergies    9/24/19 No











Physical Exam:


PE:





Constitutional: Well developed, well nourished, mild distress non-toxic 

appearance. []


HENT: Normocephalic, atraumatic, bilateral external ears normal, no trismus nose

 normal. []


Eyes: PERRLA, EOMI, conjunctiva normal, no discharge. [] 


Neck: Normal range of motion, no tenderness, supple, no stridor. [] 


Cardiovascular:Heart rate regular rhythm, peripheral pulses are intact, cap 

refill is brisk


Lungs & Thorax:  Bilateral breath sounds clear, no respiratory distress


Abdomen: soft, no tenderness, no masses, no pulsatile masses. [] 


Skin: Warm, dry, no erythema, no rash. [] 


Back: No tenderness, no CVA tenderness. [] 


Extremities: No tenderness, no cyanosis, no clubbing, ROM intact, no edema. [] 


Neurologic: Alert and oriented X 3, normal motor function, normal sensory 

function, no focal deficits noted. []


Psychologic: Affect normal, judgement normal, mood normal. []





Current Patient Data:


Labs:





Laboratory Tests








Test


 11/22/20


08:36 11/22/20


08:53 11/22/20


08:58


 


Urine Collection Type Unknown   


 


Urine Color Yellow   


 


Urine Clarity Clear   


 


Urine pH 5.5   


 


Urine Specific Gravity 1.025   


 


Urine Protein Negative mg/dL   


 


Urine Glucose (UA) Negative mg/dL   


 


Urine Ketones (Stick) Negative mg/dL   


 


Urine Blood Moderate   


 


Urine Nitrite Negative   


 


Urine Bilirubin Negative   


 


Urine Urobilinogen Dipstick 0.2 mg/dL   


 


Urine Leukocyte Esterase Small   


 


Urine RBC 6-10 /HPF   


 


Urine WBC 1-4 /HPF   


 


Urine Squamous Epithelial


Cells Many /LPF 


 


 





 


Urine Bacteria Few /HPF   


 


Urine Mucus Mod /LPF   


 


White Blood Count  7.3 x10^3/uL  


 


Red Blood Count  4.03 x10^6/uL  


 


Hemoglobin  11.9 g/dL  


 


Hematocrit  34.1 %  


 


Mean Corpuscular Volume  85 fL  


 


Mean Corpuscular Hemoglobin  30 pg  


 


Mean Corpuscular Hemoglobin


Concent 


 35 g/dL 


 





 


Red Cell Distribution Width  13.1 %  


 


Platelet Count  237 x10^3/uL  


 


Neutrophils (%) (Auto)  60 %  


 


Lymphocytes (%) (Auto)  30 %  


 


Monocytes (%) (Auto)  7 %  


 


Eosinophils (%) (Auto)  2 %  


 


Basophils (%) (Auto)  0 %  


 


Neutrophils # (Auto)  4.4 x10^3/uL  


 


Lymphocytes # (Auto)  2.2 x10^3/uL  


 


Monocytes # (Auto)  0.5 x10^3/uL  


 


Eosinophils # (Auto)  0.2 x10^3/uL  


 


Basophils # (Auto)  0.0 x10^3/uL  


 


Sodium Level  138 mmol/L  


 


Potassium Level  3.5 mmol/L  


 


Chloride Level  102 mmol/L  


 


Carbon Dioxide Level  27 mmol/L  


 


Anion Gap  9  


 


Blood Urea Nitrogen  13 mg/dL  


 


Creatinine  1.0 mg/dL  


 


Estimated GFR


(Cockcroft-Gault) 


 67.0 


 





 


Glucose Level  97 mg/dL  


 


Calcium Level  9.6 mg/dL  


 


Bedside Urine HCG, Qualitative   Hcg negative 








Current Medications








 Medications


  (Trade)  Dose


 Ordered  Sig/Florence


 Route


 PRN Reason  Start Time


 Stop Time Status Last Admin


Dose Admin


 


 Ketorolac


 Tromethamine


  (Toradol 15mg


 Vial)  15 mg  1X  ONCE


 IVP


   11/22/20 08:45


 11/22/20 08:48 DC 11/22/20 09:22





 


 Ondansetron HCl


  (Zofran)  4 mg  1X  ONCE


 IVP


   11/22/20 08:45


 11/22/20 08:48 DC 11/22/20 09:22





 


 Morphine Sulfate


  (Morphine


 Sulfate)  4 mg  1X  ONCE


 IV


   11/22/20 08:45


 11/22/20 08:48 DC 11/22/20 09:23











Vital Signs:





Vital Signs








  Date Time  Temp Pulse Resp B/P (MAP) Pulse Ox O2 Delivery O2 Flow Rate FiO2


 


11/22/20 08:42 98.0 82 16 138/81 (100) 98 Room Air  





 98.0       











EKG:


EKG:


[]





Radiology/Procedures:


Radiology/Procedures:


[]





Course & Med Decision Making:


Course & Med Decision Making


Pertinent Labs and Imaging studies reviewed. (See chart for details)





[] Patient reassessed at 10:30 AM and pain is down to 4 out of 10.  Patient 

appears to be resting comfortably.





25-year-old female presents with left-sided flank pain.  Patient seen here 2 

days ago and diagnosed with a left proximal ureteral stone.  On reassessment 

patient asked for repeat CAT scan and I informed her that I do not want to 

radiate her again.  I informed her that her pain is now much better controlled 

and there is no indication for admission to the hospital at this time.  I also 

informed her that we do not have urology at this hospital and if she would need 

to be admitted to need to be transferred.  I will increase the strength of her 

oxycodone and give her a prescription for Toradol and Zofran as well.  Patient 

given referral to urology.





MIPS measure: hCG checked and is negative.





Dragon Disclaimer:


Dragon Disclaimer:


This electronic medical record was generated, in whole or in part, using a voice

 recognition dictation system.





Departure


Departure


Impression:  


   Primary Impression:  


   Left ureteral stone


Disposition:  01 DC HOME SELF CARE/HOMELESS


Condition:  STABLE


Referrals:  


NO PCP (PCP)








UROLOGY


Patient Instructions:  Kidney Stones





Additional Instructions:  


EMERGENCY DEPARTMENT GENERAL DISCHARGE INSTRUCTIONS





THANK YOU for coming to [facility] Emergency Department (ED) 

today and 


trusting us with your care.  We trust that you had a positive experience in our 

Emergency 


Department. If you wish to speak to the department Management you can contact 

the department 


Director at [phone].








YOUR FOLLOW UP INSTRUCTIONS ARE AS FOLLOWS: 





Do you have a private doctor? If you do not have a private doctor, please ask 

for a resource 


list of physicians or clinics that may be able to assist you with follow up 

care.  





The Emergency Physician has interpreted your x-rays. The X-ray specialist will 

also review 


them. If there is a change in the findings you will be notified in 48 hours when

 at all 


possible. 





A lab test or lab culture may have been done, your results will be reviewed and 

you will be 


notified if you need a change in treatment. 








ADDITIONAL INSTRUCTIONS AND INFORMATION 





Your care today has been supervised by a physician who is specially trained in 

emergency 


care. Many problems require more than one evaluation for a complete diagnosis 

and treatment. 


We recommend that you schedule your follow up appointment as recommended to 

ensure complete 


treatment of your illness or injury.  If you are unable to obtain follow up care

 and 


continue to have a problem, or if your condition worsens we recommend that you 

return to the 


ED. 





We are not able to safely determine your condition over the phone nor are we 

able to give 


sound medical advice over the phone. For these safety reasons, if you call for 

medical 


advice we will ask you to come to the ED for further evaluation





If you have any questions regarding these discharge instructions please call the

 ED at [phone].





SAFETY INFORMATION





In the interest of safety, wellness, and injury prevention; we encourage you to 

wear your 


seatbelt, if you smoke; quit smoking, and we encourage your family to use 

protective helmet 


for bicycling and other sporting events that present an increased risk for head 

injury. 





IF YOUR SYMPTOMS WORSEN OR NEW SYMPTOMS DEVELOP, OR YOU HAVE CONCERNS ABOUT YOUR

 CONDITION; 


OR IF YOUR CONDITION WORSENS WHILE YOU ARE WAITING FOR YOUR FOLLOW UP 

APPOINTMENT;  EITHER  


CONTACT YOUR PRIMARY CARE DOCTOR, THE PHYSICIAN WHOSE NAME AND NUMBER YOU WERE 

GIVEN,  OR 


RETURN TO THE  ED IMMEDIATELY.


Scripts


Fluconazole (DIFLUCAN) 150 Mg Tablet


1 TAB PO ONCE, #1 TAB 1 Refill


   Prov: STACY KIRKPATRICK MD         11/22/20 


Cephalexin (KEFLEX) 500 Mg Capsule


1 CAP PO TID, #21 CAP 0 Refills


   Prov: STACY KIRKPATRICK MD         11/22/20 


Ondansetron Hcl (ZOFRAN) 4 Mg Tablet


1 TAB PO PRN Q6-8HRS for NAUSEA, #12 TAB


   Prov: STACY KIRKPATRICK MD         11/22/20 


Ibuprofen (IBUPROFEN) 600 Mg Tablet


600 MG PO Q8HRS PRN for PAIN, #30 TAB


   Prov: STACY KIRKPATRICK MD         11/22/20 


Oxycodone/Apap  (PERCOCET  MG TABLET **) 1 Each Tablet


1 TAB PO QIDPRN PRN for PAIN MDD 4 Tablet(s) for 5 Days, #20 TAB 0 Refills


   Prov: STACY KIRKPATRICK MD         11/22/20











STACY KIRKPATRICK MD              Nov 22, 2020 08:47

## 2021-05-09 ENCOUNTER — HOSPITAL ENCOUNTER (EMERGENCY)
Dept: HOSPITAL 61 - ER | Age: 26
Discharge: HOME | End: 2021-05-09
Payer: COMMERCIAL

## 2021-05-09 VITALS — DIASTOLIC BLOOD PRESSURE: 97 MMHG | SYSTOLIC BLOOD PRESSURE: 149 MMHG

## 2021-05-09 VITALS — WEIGHT: 250.45 LBS | BODY MASS INDEX: 46.09 KG/M2 | HEIGHT: 62 IN

## 2021-05-09 DIAGNOSIS — Z20.822: ICD-10-CM

## 2021-05-09 DIAGNOSIS — J02.8: Primary | ICD-10-CM

## 2021-05-09 DIAGNOSIS — B97.89: ICD-10-CM

## 2021-05-09 PROCEDURE — 99284 EMERGENCY DEPT VISIT MOD MDM: CPT

## 2021-05-09 PROCEDURE — 87880 STREP A ASSAY W/OPTIC: CPT

## 2021-05-09 PROCEDURE — 87070 CULTURE OTHR SPECIMN AEROBIC: CPT

## 2021-05-09 PROCEDURE — 71046 X-RAY EXAM CHEST 2 VIEWS: CPT

## 2021-05-09 PROCEDURE — U0003 INFECTIOUS AGENT DETECTION BY NUCLEIC ACID (DNA OR RNA); SEVERE ACUTE RESPIRATORY SYNDROME CORONAVIRUS 2 (SARS-COV-2) (CORONAVIRUS DISEASE [COVID-19]), AMPLIFIED PROBE TECHNIQUE, MAKING USE OF HIGH THROUGHPUT TECHNOLOGIES AS DESCRIBED BY CMS-2020-01-R: HCPCS

## 2021-05-09 NOTE — PHYS DOC
Past Medical History


Past Medical History:  Kidney Stone


 (LAQUITA BOWEN WALDO APRN)


Past Surgical History:  Cholecystectomy


 (LAQUITA BOWEN WALDO APRN)


Smoking Status:  Never Smoker


Alcohol Use:  None


Drug Use:  None


 (LAQUITA BOWEN APRN)





General Adult


EDM:


Chief Complaint:  COUGH





HPI:


HPI:





Patient is a 25  year old female who presents to the ED today complaining of 

cough, sore throat, nasal congestion, symptoms began yesterday.  Patient denies 

any fever or shortness of breath.


 (LAQUITA BOWEN APRN)





Review of Systems:


Review of Systems:


Constitutional:   Denies fever or chills. []


Eyes:   Denies change in visual acuity. []


HENT:   Reports sore throat and nasal congestion


Respiratory:   Reports cough, denies shortness of breath. [] 


Cardiovascular:   Denies chest pain or edema. [] 


GI:   Denies abdominal pain, nausea, vomiting, bloody stools or diarrhea. [] 


:  Denies dysuria. [] 


Musculoskeletal:   Denies back pain or joint pain. [] 


Integument:   Denies rash. [] 


Neurologic:   Denies headache, focal weakness or sensory changes. [] 


Psychiatric:  Denies depression or anxiety. []


 (LAQUITA BOWEN WALDO APRN)





Heart Score:


C/O Chest Pain:  N/A


Risk Factors:


Risk Factors:  DM, Current or recent (<one month) smoker, HTN, HLP, family 

history of CAD, obesity.


Risk Scores:


Score 0 - 3:  2.5% MACE over next 6 weeks - Discharge Home


Score 4 - 6:  20.3% MACE over next 6 weeks - Admit for Clinical Observation


Score 7 - 10:  72.7% MACE over next 6 weeks - Early Invasive Strategies


 (LAQUITA BOWEN WALDO APRN)





Allergies:


Allergies:





Allergies








Coded Allergies Type Severity Reaction Last Updated Verified


 


  No Known Drug Allergies    9/24/19 No








 (LAQUITA BOWEN WALDO APRN)





Physical Exam:


PE:





Constitutional: Well developed, well nourished, no acute distress, non-toxic 

appearance. []


HENT: Normocephalic, atraumatic, bilateral external ears normal, oropharynx 

moist, no oral exudates, patient sounds congested nasally


Midline uvula, +2 tonsils with no erythema, no exudate.


Eyes: PERRLA, EOMI, conjunctiva normal, no discharge. [] 


Neck: Normal range of motion, no tenderness, supple, no stridor. [] 


Cardiovascular:Heart rate regular rhythm, no murmur []


Lungs & Thorax:  Bilateral breath sounds clear to auscultation []


Abdomen: Bowel sounds normal, soft, no tenderness, no masses, no pulsatile 

masses. [] 


Skin: Warm, dry, no erythema, no rash. [] 


Back: No tenderness, no CVA tenderness. [] 


Extremities: No tenderness, no cyanosis, no clubbing, ROM intact, no edema. [] 


Neurologic: Alert and oriented X 3, normal motor function, normal sensory 

function, no focal deficits noted. []


Psychologic: Affect normal, judgement normal, mood normal. []


 (LAQUITA BOWEN)





EKG:


EKG:


[]


 (LAQUITA BOWEN)





Radiology/Procedures:


Radiology/Procedures:


[]PROCEDURE: CHEST PA & LATERAL





Two-view chest dated 5/9/2021.





No comparison available.





CLINICAL INDICATION: Flulike symptoms for 2 days.





FINDINGS:





PA and lateral views obtained. Heart and mediastinal contours within normal 

limits. Lungs are clear. No consolidation or pleural effusion. No pneumothorax.





IMPRESSION:





No evidence of focal pneumonia.





Electronically signed by: Rocky Marshall MD (5/9/2021 1:03 PM) [id number]














DICTATED and SIGNED BY:     ROCKY MARSHALL MD


DATE:     05/09/21 [id number]


 (LAQUITA BOWEN)





Course & Med Decision Making:


Course & Med Decision Making


Pertinent Labs and Imaging studies reviewed. (See chart for details)





This is a 25-year-old female patient presenting to the ED today with sore throat

 cough and nasal congestion, symptoms  began yesterday





Chest x-ray interpreted by radiologist as negative for any acute findings





Negative rapid strep





COVID-19 test pending, patient instructed to quarantine herself until results 

are available





Supportive care measures recommended including pushing fluids, salt water 

gargles, hand hygiene, wearing a mask and resting.





Follow-up with primary care doctor in 1 to 2 weeks














 (LAQUITA BOWEN)





Dragon Disclaimer:


Dragon Disclaimer:


This electronic medical record was generated, in whole or in part, using a voice

 recognition dictation system.


 (LAQUITA BOWEN)





Departure


Departure


Impression:  


   Primary Impression:  


   Upper respiratory infection


   Qualified Codes:  J06.9 - Acute upper respiratory infection, unspecified


   Additional Impressions:  


   Cough


   Viral pharyngitis


   Person under investigation for COVID-19


Disposition:  01 HOME / SELF CARE / HOMELESS


Condition:  STABLE


Referrals:  


NO PCP (PCP)


follow up with your own doctor in 1 week


Patient Instructions:  Cough, Adult, Easy-to-Read, Upper Respiratory Infection, 

Adult, Viral Pharyngitis





Additional Instructions:  


Your chest x-ray in the emergency room is negative for any acute findings, your 

strep test is negative.  This is a rapid test.  If your strep culture comes back

 positive we will call you with results.  Your COVID-19 test is pending.  Please

 do not go to work until you get results from us.  Maintain good hand hygiene, 

push fluids, rest, use salt water gargles as needed for your sore throat.  Take 

Tylenol or Motrin for pain or fever.





Attending Signature


Attending Signature


I have participated in the care of this patient and I have reviewed and agree 

with all pertinent clinical information above including history, exam, and 

recommendations.





 (HAYDEE LOW DO)











LAQUITA BOWEN             May 9, 2021 12:51


HAYDEE LOW DO                 May 9, 2021 17:44

## 2021-05-09 NOTE — RAD
Two-view chest dated 5/9/2021.



No comparison available.



CLINICAL INDICATION: Flulike symptoms for 2 days.



FINDINGS:



PA and lateral views obtained. Heart and mediastinal contours within normal limits. Lungs are clear. 
No consolidation or pleural effusion. No pneumothorax.



IMPRESSION:



No evidence of focal pneumonia.



Electronically signed by: Rocky Marshall MD (5/9/2021 1:03 PM) [id number]

## 2021-05-10 NOTE — NUR
IP: Attempted to contact pt concerning COVID results. No answer, left a voicemail to return 
the call.

## 2021-07-31 ENCOUNTER — HOSPITAL ENCOUNTER (EMERGENCY)
Dept: HOSPITAL 61 - ER | Age: 26
LOS: 1 days | Discharge: HOME | End: 2021-08-01
Payer: COMMERCIAL

## 2021-07-31 VITALS — WEIGHT: 250.45 LBS | HEIGHT: 62 IN | BODY MASS INDEX: 46.09 KG/M2

## 2021-07-31 DIAGNOSIS — R11.2: Primary | ICD-10-CM

## 2021-07-31 DIAGNOSIS — Z90.49: ICD-10-CM

## 2021-07-31 DIAGNOSIS — R53.83: ICD-10-CM

## 2021-07-31 DIAGNOSIS — R19.7: ICD-10-CM

## 2021-07-31 DIAGNOSIS — Z20.822: ICD-10-CM

## 2021-07-31 DIAGNOSIS — Z87.442: ICD-10-CM

## 2021-07-31 PROCEDURE — 87426 SARSCOV CORONAVIRUS AG IA: CPT

## 2021-07-31 PROCEDURE — U0003 INFECTIOUS AGENT DETECTION BY NUCLEIC ACID (DNA OR RNA); SEVERE ACUTE RESPIRATORY SYNDROME CORONAVIRUS 2 (SARS-COV-2) (CORONAVIRUS DISEASE [COVID-19]), AMPLIFIED PROBE TECHNIQUE, MAKING USE OF HIGH THROUGHPUT TECHNOLOGIES AS DESCRIBED BY CMS-2020-01-R: HCPCS

## 2021-07-31 PROCEDURE — 80048 BASIC METABOLIC PNL TOTAL CA: CPT

## 2021-07-31 PROCEDURE — 36415 COLL VENOUS BLD VENIPUNCTURE: CPT

## 2021-07-31 PROCEDURE — 85025 COMPLETE CBC W/AUTO DIFF WBC: CPT

## 2021-07-31 PROCEDURE — 99285 EMERGENCY DEPT VISIT HI MDM: CPT

## 2021-08-01 VITALS — DIASTOLIC BLOOD PRESSURE: 93 MMHG | SYSTOLIC BLOOD PRESSURE: 147 MMHG

## 2021-08-01 LAB
ANION GAP SERPL CALC-SCNC: 9 MMOL/L (ref 6–14)
BASOPHILS # BLD AUTO: 0.1 X10^3/UL (ref 0–0.2)
BASOPHILS NFR BLD: 1 % (ref 0–3)
BUN SERPL-MCNC: 9 MG/DL (ref 7–20)
CALCIUM SERPL-MCNC: 9.4 MG/DL (ref 8.5–10.1)
CHLORIDE SERPL-SCNC: 101 MMOL/L (ref 98–107)
CO2 SERPL-SCNC: 29 MMOL/L (ref 21–32)
CREAT SERPL-MCNC: 0.8 MG/DL (ref 0.6–1)
EOSINOPHIL NFR BLD: 0.1 X10^3/UL (ref 0–0.7)
EOSINOPHIL NFR BLD: 1 % (ref 0–3)
ERYTHROCYTE [DISTWIDTH] IN BLOOD BY AUTOMATED COUNT: 13.3 % (ref 11.5–14.5)
GFR SERPLBLD BASED ON 1.73 SQ M-ARVRAT: 86 ML/MIN
GLUCOSE SERPL-MCNC: 95 MG/DL (ref 70–99)
HCT VFR BLD CALC: 37.3 % (ref 36–47)
HGB BLD-MCNC: 12.9 G/DL (ref 12–15.5)
LYMPHOCYTES # BLD: 2.4 X10^3/UL (ref 1–4.8)
LYMPHOCYTES NFR BLD AUTO: 31 % (ref 24–48)
MCH RBC QN AUTO: 30 PG (ref 25–35)
MCHC RBC AUTO-ENTMCNC: 35 G/DL (ref 31–37)
MCV RBC AUTO: 86 FL (ref 79–100)
MONO #: 0.4 X10^3/UL (ref 0–1.1)
MONOCYTES NFR BLD: 5 % (ref 0–9)
NEUT #: 4.8 X10^3/UL (ref 1.8–7.7)
NEUTROPHILS NFR BLD AUTO: 62 % (ref 31–73)
PLATELET # BLD AUTO: 276 X10^3/UL (ref 140–400)
POTASSIUM SERPL-SCNC: 3.7 MMOL/L (ref 3.5–5.1)
RBC # BLD AUTO: 4.36 X10^6/UL (ref 3.5–5.4)
SODIUM SERPL-SCNC: 139 MMOL/L (ref 136–145)
WBC # BLD AUTO: 7.7 X10^3/UL (ref 4–11)

## 2021-08-01 NOTE — PHYS DOC
Past Medical History


Past Medical History:  Kidney Stone


Past Surgical History:  Cholecystectomy


Smoking Status:  Never Smoker


Alcohol Use:  None


Drug Use:  None





General Adult


EDM:


Chief Complaint:  MULTIPLE COMPLAINTS





HPI:


HPI:





Patient is a 26  year old female without pertinent past medical history who 

presents with 4 days of poor appetite, nausea, vomiting, diarrhea, and 

generalized fatigue.  She is also had some mild cough.  Denies shortness of 

breath or chest pain.  Has been exposed to her housemate and sister who tested 

positive for Covid on Wednesday of last week.  She is not vaccinated.  Estimates

that she vomited 34 times today, and had 6-7 episodes of nonbloody diarrhea.





Review of Systems:


Review of Systems:


Constitutional:   Denies fever or chills. []


Eyes:   Denies change in visual acuity. []


HENT:   Denies nasal congestion or sore throat. [] 


Respiratory:   Denies cough or shortness of breath. [] 


Cardiovascular:   Denies chest pain or edema. [] 


GI:   Denies abdominal pain, nausea, vomiting, bloody stools or diarrhea. [] 


:  Denies dysuria. [] 


Musculoskeletal:   Denies back pain or joint pain. [] 


Integument:   Denies rash. [] 


Neurologic:   Denies headache, focal weakness or sensory changes. [] 


Endocrine:   Denies polyuria or polydipsia. [] 


Lymphatic:  Denies swollen glands. [] 


Psychiatric:  Denies depression or anxiety. []





Heart Score:


C/O Chest Pain:  No


Risk Factors:


Risk Factors:  DM, Current or recent (<one month) smoker, HTN, HLP, family 

history of CAD, obesity.


Risk Scores:


Score 0 - 3:  2.5% MACE over next 6 weeks - Discharge Home


Score 4 - 6:  20.3% MACE over next 6 weeks - Admit for Clinical Observation


Score 7 - 10:  72.7% MACE over next 6 weeks - Early Invasive Strategies





Family History:


Family History:


Sister test positive for Covid





Allergies:


Allergies:





Allergies








Coded Allergies Type Severity Reaction Last Updated Verified


 


  No Known Drug Allergies    9/24/19 No











Physical Exam:


PE:





Constitutional: Well developed, well nourished, no acute distress, non-toxic 

appearance. []


HENT: Normocephalic, atraumatic, bilateral external ears normal, oropharynx 

moist, no oral exudates, nose normal. []


Eyes: PERRLA, EOMI, conjunctiva normal, no discharge. [] 


Neck: Normal range of motion, no tenderness, supple, no stridor. [] 


Cardiovascular:Heart rate regular rhythm, no murmur []


Lungs & Thorax: Normal work of breathing.  Bilateral breath sounds clear to 

auscultation []


Abdomen: Bowel sounds normal, soft, no tenderness, no masses, no pulsatile 

masses. [] 


Skin: Warm, dry, no erythema, no rash. [] 


Back: No tenderness, no CVA tenderness. [] 


Extremities: No tenderness, no cyanosis, no clubbing, ROM intact, no edema. [] 


Neurologic: Alert and oriented X 3, normal motor function, normal sensory 

function, no focal deficits noted. []


Psychologic: Affect normal, judgement normal, mood normal. []





EKG:


EKG:


[]





Radiology/Procedures:


Radiology/Procedures:


[]





Course & Med Decision Making:


Course & Med Decision Making


Pertinent Labs and Imaging studies reviewed. (See chart for details)





Patient is a 26-year-old female who presents with nausea/vomiting, diarrhea, 

generalized fatigue, poor appetite, cough in the setting of a Covid exposure in 

her home.  Her sister tested positive on Wednesday of this week.  She is 

unvaccinated.


Covid is highly likely the cause of her symptoms.  Will provide with antiemetics

 and check electrolytes.  Her exam is reassuring against pneumonia.  Do not feel

 that she requires chest x-ray at this time.


0003





Rapid Covid is negative.  PCR Covid is pending.


The BMP and CBC are normal.


Feel she be safely discharged home with a prescription for Zofran


[id number]





Dragon Disclaimer:


Dragon Disclaimer:


This electronic medical record was generated, in whole or in part, using a voice

 recognition dictation system.





Departure


Departure


Impression:  


   Primary Impression:  


   Nausea & vomiting


Disposition:  01 HOME / SELF CARE / HOMELESS


Condition:  STABLE


Referrals:  


NO PCP (PCP)





Additional Instructions:  


Your rapid Covid test was negative.  These do have a high degree of false 

negatives.  The more definitive PCR test is still pending.


Your labs were reassuring.


We we'll give you a prescription for medication called Zofran.


You can take this 4 mg every 6 hours as needed for nausea/vomiting.


Please self isolate until you know the results of your PCR test.


If you develop shortness of breath please return to the emergency department for

 reevaluation.


Scripts


Ondansetron Hcl (ZOFRAN) 4 Mg Tablet


1 TAB PO PRN Q6-8HRS for nausea, #12 TAB


   Prov: GIOVANNA FREEDMAN MD         8/1/21











GIOVANNA FREEDMAN MD               Aug 1, 2021 00:03

## 2021-08-17 ENCOUNTER — HOSPITAL ENCOUNTER (EMERGENCY)
Dept: HOSPITAL 61 - ER | Age: 26
Discharge: HOME | End: 2021-08-17
Payer: COMMERCIAL

## 2021-08-17 VITALS — WEIGHT: 237.44 LBS | BODY MASS INDEX: 43.69 KG/M2 | HEIGHT: 62 IN

## 2021-08-17 VITALS — SYSTOLIC BLOOD PRESSURE: 123 MMHG | DIASTOLIC BLOOD PRESSURE: 73 MMHG

## 2021-08-17 DIAGNOSIS — J18.9: ICD-10-CM

## 2021-08-17 DIAGNOSIS — Z90.49: ICD-10-CM

## 2021-08-17 DIAGNOSIS — R31.9: ICD-10-CM

## 2021-08-17 DIAGNOSIS — U07.1: Primary | ICD-10-CM

## 2021-08-17 DIAGNOSIS — Z87.442: ICD-10-CM

## 2021-08-17 DIAGNOSIS — N39.0: ICD-10-CM

## 2021-08-17 LAB
AGAP ISTAT: 20 MMOL/L (ref 6–14)
ALBUMIN SERPL-MCNC: 3.7 G/DL (ref 3.4–5)
ALBUMIN/GLOB SERPL: 0.8 {RATIO} (ref 1–1.7)
ALP SERPL-CCNC: 49 U/L (ref 46–116)
ALT SERPL-CCNC: 89 U/L (ref 14–59)
ANION GAP SERPL CALC-SCNC: 14 MMOL/L (ref 6–14)
APTT PPP: YELLOW S
AST SERPL-CCNC: 88 U/L (ref 15–37)
BACTERIA #/AREA URNS HPF: (no result) /HPF
BASOPHILS # BLD AUTO: 0 X10^3/UL (ref 0–0.2)
BASOPHILS NFR BLD: 0 % (ref 0–3)
BILIRUB SERPL-MCNC: 0.6 MG/DL (ref 0.2–1)
BILIRUB UR QL STRIP: NEGATIVE
BUN ISTAT: 7 MG/DL (ref 8–26)
BUN SERPL-MCNC: 9 MG/DL (ref 7–20)
BUN/CREAT SERPL: 10 (ref 6–20)
CALCIUM SERPL-MCNC: 8.9 MG/DL (ref 8.5–10.1)
CHLORIDE SERPL-SCNC: 98 MMOL/L (ref 98–107)
CHLORIDE SERPL-SCNC: 98 MMOL/L (ref 98–110)
CO2 BLD-SCNC: 24 MMOL/L (ref 23–32)
CO2 SERPL-SCNC: 26 MMOL/L (ref 21–32)
CREAT SERPL-MCNC: 0.9 MG/DL (ref 0.6–1)
CREATININE ISTAT: 0.6 MG/DL (ref 0.5–1.4)
EOSINOPHIL NFR BLD: 0 X10^3/UL (ref 0–0.7)
EOSINOPHIL NFR BLD: 1 % (ref 0–3)
ERYTHROCYTE [DISTWIDTH] IN BLOOD BY AUTOMATED COUNT: 13.5 % (ref 11.5–14.5)
FIBRINOGEN PPP-MCNC: (no result) MG/DL
GFR SERPLBLD BASED ON 1.73 SQ M-ARVRAT: 75.1 ML/MIN
GLUCOSE BLD-MCNC: 125 MG/DL (ref 70–99)
GLUCOSE SERPL-MCNC: 119 MG/DL (ref 70–99)
HCT VFR BLD AUTO: 38 % (ref 36–40)
HCT VFR BLD CALC: 37 % (ref 36–47)
HEMOGLOBIN ISTAT: 12.9 G/DL (ref 12–15)
HGB BLD-MCNC: 12.8 G/DL (ref 12–15.5)
HYALINE CASTS #/AREA URNS LPF: (no result) /HPF
ION CA ISTAT: 1.13 MMOL/L (ref 1.13–1.32)
LIPASE: 86 U/L (ref 73–393)
LYMPHOCYTES # BLD: 0.6 X10^3/UL (ref 1–4.8)
LYMPHOCYTES NFR BLD AUTO: 13 % (ref 24–48)
MCH RBC QN AUTO: 29 PG (ref 25–35)
MCHC RBC AUTO-ENTMCNC: 35 G/DL (ref 31–37)
MCV RBC AUTO: 83 FL (ref 79–100)
MONO #: 0.2 X10^3/UL (ref 0–1.1)
MONOCYTES NFR BLD: 5 % (ref 0–9)
NEUT #: 4 X10^3/UL (ref 1.8–7.7)
NEUTROPHILS NFR BLD AUTO: 81 % (ref 31–73)
NITRITE UR QL STRIP: NEGATIVE
PH UR STRIP: 6 [PH]
PLATELET # BLD AUTO: 171 X10^3/UL (ref 140–400)
POTASSIUM BLD-SCNC: 3 MMOL/L (ref 3.5–5)
POTASSIUM SERPL-SCNC: 3.3 MMOL/L (ref 3.5–5.1)
PROT SERPL-MCNC: 8.4 G/DL (ref 6.4–8.2)
PROT UR STRIP-MCNC: 30 MG/DL
RBC # BLD AUTO: 4.43 X10^6/UL (ref 3.5–5.4)
RBC #/AREA URNS HPF: (no result) /HPF (ref 0–2)
SODIUM SERPL-SCNC: 138 MMOL/L (ref 135–145)
SODIUM SERPL-SCNC: 138 MMOL/L (ref 136–145)
UROBILINOGEN UR-MCNC: 0.2 MG/DL
WBC # BLD AUTO: 4.9 X10^3/UL (ref 4–11)
WBC #/AREA URNS HPF: (no result) /HPF (ref 0–4)

## 2021-08-17 PROCEDURE — 96375 TX/PRO/DX INJ NEW DRUG ADDON: CPT

## 2021-08-17 PROCEDURE — 85025 COMPLETE CBC W/AUTO DIFF WBC: CPT

## 2021-08-17 PROCEDURE — 80047 BASIC METABLC PNL IONIZED CA: CPT

## 2021-08-17 PROCEDURE — 80053 COMPREHEN METABOLIC PANEL: CPT

## 2021-08-17 PROCEDURE — 96374 THER/PROPH/DIAG INJ IV PUSH: CPT

## 2021-08-17 PROCEDURE — 93005 ELECTROCARDIOGRAM TRACING: CPT

## 2021-08-17 PROCEDURE — 36415 COLL VENOUS BLD VENIPUNCTURE: CPT

## 2021-08-17 PROCEDURE — 96361 HYDRATE IV INFUSION ADD-ON: CPT

## 2021-08-17 PROCEDURE — 87426 SARSCOV CORONAVIRUS AG IA: CPT

## 2021-08-17 PROCEDURE — 71045 X-RAY EXAM CHEST 1 VIEW: CPT

## 2021-08-17 PROCEDURE — 83690 ASSAY OF LIPASE: CPT

## 2021-08-17 PROCEDURE — 99285 EMERGENCY DEPT VISIT HI MDM: CPT

## 2021-08-17 PROCEDURE — 84484 ASSAY OF TROPONIN QUANT: CPT

## 2021-08-17 PROCEDURE — 81001 URINALYSIS AUTO W/SCOPE: CPT

## 2021-08-17 PROCEDURE — 87086 URINE CULTURE/COLONY COUNT: CPT

## 2021-08-17 PROCEDURE — 81025 URINE PREGNANCY TEST: CPT

## 2021-08-17 PROCEDURE — 74177 CT ABD & PELVIS W/CONTRAST: CPT

## 2021-08-17 NOTE — PHYS DOC
Past Medical History


Past Medical History:  Kidney Stone


Past Surgical History:  Cholecystectomy


Smoking Status:  Never Smoker


Alcohol Use:  None


Drug Use:  None





General Adult


EDM:


Chief Complaint:  FLU SYMPTOM





HPI:


HPI:





Patient is a 26  year old female presents to the emergency department chief 

complaint of increased shortness of breath with cough, left lower quadrant pain 

and left flank pain with diarrhea, loss of taste loss of smell, nausea and 

vomiting, body aches, fevers and chills since her mother tested positive for 

Covid this past Thursday.  Patient denies receiving the Covid virus vaccine 

series, reports a history of PCOS and last menstrual cycle was 2020.  

States she takes no prescription medications at home.  Denies allergies to 

medications.  Patient reports a past surgical history of gallbladder removed.  

Past medical history of chronic kidney stones.  Patient denies any other 

physical complaints or physical concerns.





Review of Systems:


Review of Systems:


14 body systems of review of systems have been reviewed.  See HPI for pertinent 

positives and negative responses, otherwise all other systems are negative, 

nonpertinent or noncontributory.


Constitutional: Negative except as outlined in HPI above.


Skin: Negative except as outlined in HPI above.


Eyes:   Negative except as outlined in HPI above.


HENT: Negative except as outlined in HPI above.


Respiratory:   Negative except as outlined in HPI above.


Cardiovascular:   Negative except as outlined in HPI above.


GI:   Negative except as outlined in HPI above.


:  Negative except as outlined in HPI above.


Musculoskeletal:   Negative except as outlined in HPI above.


Integument:   Negative except as outlined in HPI above.


Neurologic:   Negative except as outlined in HPI above.


Endocrine:   Negative except as outlined in HPI above.


Lymphatic:  Negative except as outlined in HPI above.


Psychiatric:  Negative except as outlined in HPI above.





Heart Score:


C/O Chest Pain:  No


Risk Factors:


Risk Factors:  DM, Current or recent (<one month) smoker, HTN, HLP, family 

history of CAD, obesity.


Risk Scores:


Score 0 - 3:  2.5% MACE over next 6 weeks - Discharge Home


Score 4 - 6:  20.3% MACE over next 6 weeks - Admit for Clinical Observation


Score 7 - 10:  72.7% MACE over next 6 weeks - Early Invasive Strategies





Allergies:


Allergies:





Allergies








Coded Allergies Type Severity Reaction Last Updated Verified


 


  No Known Drug Allergies    19 No











Physical Exam:


PE:





Constitutional: Well developed, well nourished, no acute distress, non-toxic 

appearance.  26-year-old female in no apparent distress.


HENT: Normocephalic, atraumatic.


Eyes: Conjunctiva normal, no discharge.


Neck: Normal range of motion, no stridor.


Cardiovascular: No cyanosis appreciated, distal cap refill less than 2 seconds.


Lungs & Thorax: Patient is in no respiratory distress, no audible adventitious 

lung sounds appreciated.  Lung sounds clear to auscultate all lung fields.


Abdomen: Nontender, no abnormalities noted.  Bowel sounds normal all 4 

quadrants.  No pain to palpation of the abdomen, no skin discoloration of the 

abdomen.  No masses appreciated.


Skin: Warm, dry, no erythema, no rash.  


Back: No tenderness, no deformities.


Extremities: No tenderness, no cyanosis, no clubbing, ROM intact, no edema.  


Neurologic: Alert and oriented X 3, normal motor function, normal sensory 

function, no focal deficits noted. 


Psychologic: Affect normal, judgement normal, mood normal.





EKG:


EKG:


EKG performed at 0 918 by ED nursing staff shows sinus tachycardia with a heart 

rate of 105 bpm without other ectopy, TX interval 0.158, QTc interval 0.440, no 

acute STEMI, no ACS, no acute ischemia appreciated, EKG interpreted by ED 

attending physician Dr. Lo.





Radiology/Procedures:


Radiology/Procedures:





PATIENT: ENDER MERRITT   ACCOUNT: [id number]     MRN#: [id number]


: 1995           LOCATION: ER              AGE: 26


SEX: F                    EXAM DT: 21         ACCESSION#: [id number]


STATUS: REG ER            ORD. PHYSICIAN: CATHERINE QUEZADA


REASON: left flank pain with diarrhea/hematuria, stone vs infectionus process


PROCEDURE: CT ABD PELV W/ORAL&IV CONTRAST





EXAM: CT Abdomen and Pelvis with IV contrast





CLINICAL HISTORY: Reason: left flank pain with diarrhea/hematuria, stone vs 

infectionus process 





COMPARISON: 2020





TECHNIQUE: Helical CT of the abdomen and pelvis was performed following the 

administration of intravenous contrast. Axial, coronal and sagittal reformatted 

images were generated.





PQRS compliance statement - One or more of the following individualized dose 

reduction techniques were utilized for this study:


1.  Automated exposure control


2.  Adjustment of the mA and/or kV according to patient size


3.  Use of iterative reconstruction technique





FINDINGS:


Lower Chest: 


Groundglass opacities lung bases likely multifocal consolidative process such as

pneumonia. Follow-up to resolution is recommended.





Abdomen and Pelvis: 


Hepatic hypoattenuation, fatty liver. Spleen, adrenal glands and pancreas are 

unremarkable. Cholecystectomy clips are seen. No biliary dilatation. Symmetric 

nephrograms. No focal renal lesion. No hydronephrosis. 





Appendix is normal. Moderate colonic stool content is seen. Fluid levels in the 

colon. No small or large bowel dilatation. No bowel obstruction.





No abdominal or pelvic ascites.





Bones: 


Lower lumbar spine degenerative changes are seen. No aggressive osseous lesion.





IMPRESSION: 


1.  Groundglass opacities lung bases likely multifocal consolidative process 

such as pneumonia. Follow-up to resolution is recommended.


2.  Hepatic hypoattenuation, fatty liver. 


3.  No renal tract calculus.


4.  Fluid levels in the colon may be seen with diarrheal state.





Electronically signed by: Nirav Carrera MD (2021 2:06 PM) [id number]








PATIENT: ENDER MERRITT   ACCOUNT: [id number]     MRN#: [id number]


: 1995           LOCATION: ER              AGE: 26


SEX: F                    EXAM DT: 21         ACCESSION#: [id number]


STATUS: REG ER            ORD. PHYSICIAN: CATHERINE QUEZADA


REASON: PUI, SHORT OF BREATH


PROCEDURE: CHEST AP ONLY








INDICATION: Reason: PUI, SHORT OF BREATH / Spl. Instructions:  / History: 





COMPARISON: May 9, 2021





FINDINGS:





Single view of chest obtained.


Hypoexpanded examination of the lungs. Cardiac silhouette is prominent but 

likely exaggerated by portable technique. Patchy opacity is seen most prominent 

at right lung base. A large portion of the left lung is obscured by the 

overlying cardiac silhouette








IMPRESSION:





*  Hypoexpanded exam with patchy opacities including at the right lung base. 

Could be infectious in nature.





Electronically signed by: Curtis Lerner MD (2021 10:12 AM) [id number]





Course & Med Decision Making:


Course & Med Decision Making


Pertinent Labs and Imaging studies reviewed. (See chart for details)





26-year-old female, vital signs reviewed, presents to the emergency department 

complaining of Covid-like symptoms since finding out her mother was Covid 

positive this past Thursday.  Physical examination unremarkable.  Will Order 

EKG, pregnancy test, urinalysis assay, Covid testing, chest x-ray, CBC, CMP, 

troponin, lipase.





Patient's urine infected however has hematuria, with patient's history of 

chronic kidney stones will order CT abdomen pelvis to rule out kidney stone 

versus other abdominal process.  The patient is not pregnant.





Patient's chest x-ray and CT abdomen pelvis consistent with viral pneumonia most

likely from Covid infection as patient tested positive for COVID-19 virus.  

Otherwise CT abdomen pelvis negative for acute process.  Will diagnose patient 

with COVID-19 virus, Covid pneumonia, urinary tract infection.





Discussed with patient ED discharge planning, antibiotic use, steroid use, 

prescription for Zofran for as needed nausea.  Patient gave verbal understanding

of and agrees with ED discharge planning.





Discussed with the patient all findings and diagnostic testing as well as the 

need to follow-up with their primary care provider for further evaluation and 

treatment or return to the ED if any new or worsening symptoms.  Strict return 

precautions were also discussed at length, the patient voiced understanding and 

agreement with the discharge planning.  The patient was nontoxic in appearance, 

in no apparent distress, and hemodynamically stable at the time of disposition.





Dragon Disclaimer:


Dragon Disclaimer:


This electronic medical record was generated, in whole or in part, using a voice

recognition dictation system.





Departure


Departure


Impression:  


   Primary Impression:  


   COVID-19 virus infection


   Additional Impressions:  


   Atypical pneumonia


   Urinary tract infection


   Qualified Codes:  N39.0 - Urinary tract infection, site not specified; R31.9 

   - Hematuria, unspecified


Disposition:  01 HOME / SELF CARE / HOMELESS


Condition:  GOOD


Referrals:  


NO PCP (PCP)


Patient Instructions:  Urinary Tract Infection





Additional Instructions:  


You were seen today in the emergency department for Covid virus symptoms, you 

were tested today in the emergency department for the COVID-19 virus, you tested

positive.  As happenstance your urine is also infected, I am treating you today 

with a medication called Levaquin, please take as directed, this will treat both

your pneumonia and your urinary tract infection.  As we discussed at length, 

please practice social distancing and COVID-19 quarantine practices, I have 

attached information to this document about the COVID-19 virus, please review.  

Thank you for visiting our Emergency Department.  It was a pleasure taking care 

of you today in the emergency department and we appreciate you trusting us with 

your care. If any additional problems come up don't hesitate to return to visit 

us. Please follow up with your primary care provider so they can plan additional

care if needed and know about the problem that you had. If symptoms worsen come 

back to the Emergency Department. Any concerning symptoms that start such as 

chest pain, shortness of air, weakness or numbness on one side of the body, 

running high fevers or any other concerning symptoms return to the ER.





EMERGENCY DEPARTMENT GENERAL DISCHARGE INSTRUCTIONS





Thank you for coming to [facility] Emergency Department (ED) 

today and 


trusting us with you care.  We trust that you had a positive experience in our 

Emergency 


Department.  If you wish to speak to the department management, you may call the

Director at 


[phone].





YOUR FOLLOW UP INSTRUCTIONS ARE AS FOLLOWS:





1.  Do you have a private Doctor?  If you do not have a private doctor, please 

ask for a 


resource list of physicians or clinics that may be able to assist you with 

follow up care.





2.  The Emergency Physicain has interpreted your x-rays.  The X-Ray specialist 

will also 


review them.  If there is a change in the findings, you will be notified in 48 

hours when at 


all possible.





3.  A lab test or culture has been done, your results will be reviewed and you 

will be 


notified if you need a change in treatment.





ADDITIONAL INSTRUCTIONS AND INFORMATION:





1.  Your care today has been supervised by a physician who is specially trained 

in emergency 


care.  Many problems require more than one evaluation for a complete diagnosis 

and 


treatment.  We recommend that you schedule your follow up appointment as 

recommended to 


ensure complete treatment of you illness or injury.  If you are unable to obtain

follow up 


care and continue to have a problem, or if your condition worsens, we recommend 

that you 


return to the ED.





2.  We are not able to safely determine your condition over the phone nor are we

able to 


give sound medical advice over the phone.  For these safety reasons, if you call

for medical 


advice we will ask you to come to the ED for further evaluation.





3.  If you have any questions regarding these discharge instructions please call

the ED at 


[phone].





SAFETY INFORMATION:





In the interest of safety, wellness, and injury prevention; we encourage you to 

wear your 


sealbelt, if you smoke; quite smoking, and we encourage family to use a 

protective helmet 


for bicycling and other sporting events that present an increased risk for head 

injury.





IF YOUR SYMPTOMS WORSEN OR NEW SYMPTOMS DEVELOP, OR YOU HAVE CONCERNS ABOUT YOUR

CONDITION; 


OR IF YOUR CONDITION WORSENS WHILE YOU ARE WAITING FOR YOUR FOLLOW UP 

APPOINTMENT; EITHER 


CONTACT YOUR PRIMARY CARE DOCTOR, THE PHYSICIAN WHOSE NAME AND NUMBER YOU WERE ROSSANA ADAMS, OR 


RETURN TO THE ED IMMEDIATELY.








You have been tested for or diagnosed with COVID-19. It is an infection caused 

by a new type 


of coronavirus. COVID-19 will cause cold-like or mild flu symptoms in most. It 

can cause 


more severe symptoms like problems breathing in some.





There is no treatment for COVID-19. The body will clear the infection over time.

Self-care 


will help to ease discomfort.





Steps to Take:


Self-Care


Rest as needed. Healthy habits may help you feel better. Steps include:





Choose healthy foods including fruits and vegetables. Drink water throughout the

day.


Get plenty of sleep each night.


If you smoke, try to quit. It may ease breathing.


Avoid alcohol.


Keep Others Healthy


The virus can spread to others. Droplets are released every time you sneeze or 

cough. The 


droplets can get into the mouth, nose, or eyes of people near you and lead to 

infection. To 


lower the chances of spreading COVID-19 to others:





Stay at home until your doctor has said it is safe to leave. If you tested 

positive this 


will mean staying isolated until both of the following are true:





At least 7 days have passed since the start of illness.


You are free of fever for at least 72 hours without the use of medicine.


During this time:





 - Avoid public areas, events, or transportation. Do not return to work or 

school until your 


doctor has said it is safe to do so.


 - Call ahead if you need to go to a medical center. Let them know you may have 

COVID-19. It 


will help them guide you where to go. They may also ask you to wear a facemask 

when you come 


to the office.


 - If you call for emergency medical services, let them know you may have COVID-

19.


While at home:





 - Try to avoid close contact with others. Stay about 6 feet away.


 - If possible, spend most of your time in a separate room from others.


 - Use a face mask if you will be in close contact with others such as sharing a

room or 


vehicle.


 - Have someone wipe down common surfaces in the home. Use household  

every day on 


areas like doorknobs, counters, or sinks.


 - Cough or sneeze into a tissue. Throw the tissue away right after use. If a 

tissue is not 


available, cough or sneeze into your elbow.


 - Wash your hands often. Wash them after sneezing or coughing. Use soap and 

water and wash 


for at least 20 seconds. Alcohol based hand  can be used if soap and 

water is not 


available.


 - Do not prepare food for others. Avoid sharing personal items like forks, 

spoons, or 


toothbrushes.


 - Avoid close contact with pets while you are sick. There is no evidence of the

virus 


passing to pets. This is a safety step until more is known about this virus.


Isolation can be frustrating. Social interaction can help. Keep in touch with 

friends and 


family through phone and tech options. You can still interact with others in 

your home, just 


keep a safe distance of about 6 feet.





Follow-up:


Your doctors office will check in with you to see if there are any changes in 

your health. 


You may be asked to keep track of symptoms to share with them. They will also 

let you know 


when you are clear to be in public again.





Problems to Look Out For:


Contact your doctor if your recovery is not going as you expect. Get emergency 

care if you 


have problems such as:





 - Trouble breathing


 - Nonstop chest pain or pressure


 - Changes in awareness, confusion, or problems waking


 - Lips or face have bluish color


 - Worsening of symptoms


If you think you have an emergency, call for emergency medical services right 

away.





As taken from [facility]


Scripts


Ondansetron (ONDANSETRON ODT) 4 Mg Tab.rapdis


1 TAB PO PRN Q6-8HRS for nausea, #16 TAB 0 Refills


   Prov: CATHERINE QUEZADA         21 


Levofloxacin (LEVOFLOXACIN) 750 Mg Tablet


1 TAB PO DAILY for atypical pneumonia and uti, #5 TAB 0 Refills


   Prov: CATHERINE QUEZADA         21











CATHERINE QUEZADA       Aug 17, 2021 09:09

## 2021-08-17 NOTE — RAD
INDICATION: Reason: PUI, SHORT OF BREATH / Spl. Instructions:  / History: 



COMPARISON: May 9, 2021



FINDINGS:



Single view of chest obtained.

Hypoexpanded examination of the lungs. Cardiac silhouette is prominent but likely exaggerated by port
able technique. Patchy opacity is seen most prominent at right lung base. A large portion of the left
 lung is obscured by the overlying cardiac silhouette





IMPRESSION:



*  Hypoexpanded exam with patchy opacities including at the right lung base. Could be infectious in n
ature.



Electronically signed by: Curtis Lerner MD (8/17/2021 10:12 AM) [id number]

## 2021-08-17 NOTE — RAD
EXAM: CT Abdomen and Pelvis with IV contrast



CLINICAL HISTORY: Reason: left flank pain with diarrhea/hematuria, stone vs infectionus process 



COMPARISON: 11/20/2020



TECHNIQUE: Helical CT of the abdomen and pelvis was performed following the administration of intrave
nous contrast. Axial, coronal and sagittal reformatted images were generated.



PQRS compliance statement - One or more of the following individualized dose reduction techniques wer
e utilized for this study:

1.  Automated exposure control

2.  Adjustment of the mA and/or kV according to patient size

3.  Use of iterative reconstruction technique



FINDINGS:

Lower Chest: 

Groundglass opacities lung bases likely multifocal consolidative process such as pneumonia. Follow-up
 to resolution is recommended.



Abdomen and Pelvis: 

Hepatic hypoattenuation, fatty liver. Spleen, adrenal glands and pancreas are unremarkable. Cholecyst
ectomy clips are seen. No biliary dilatation. Symmetric nephrograms. No focal renal lesion. No hydron
ephrosis. 



Appendix is normal. Moderate colonic stool content is seen. Fluid levels in the colon. No small or la
rge bowel dilatation. No bowel obstruction.



No abdominal or pelvic ascites.



Bones: 

Lower lumbar spine degenerative changes are seen. No aggressive osseous lesion.



IMPRESSION: 

1.  Groundglass opacities lung bases likely multifocal consolidative process such as pneumonia. Follo
w-up to resolution is recommended.

2.  Hepatic hypoattenuation, fatty liver. 

3.  No renal tract calculus.

4.  Fluid levels in the colon may be seen with diarrheal state.



Electronically signed by: Nirav Carrera MD (8/17/2021 2:06 PM) [id number]

## 2021-08-20 ENCOUNTER — HOSPITAL ENCOUNTER (EMERGENCY)
Dept: HOSPITAL 61 - ER | Age: 26
Discharge: HOME | End: 2021-08-20
Payer: COMMERCIAL

## 2021-08-20 VITALS — DIASTOLIC BLOOD PRESSURE: 61 MMHG | SYSTOLIC BLOOD PRESSURE: 98 MMHG

## 2021-08-20 VITALS — HEIGHT: 62 IN | WEIGHT: 250.45 LBS | BODY MASS INDEX: 46.09 KG/M2

## 2021-08-20 DIAGNOSIS — R11.2: ICD-10-CM

## 2021-08-20 DIAGNOSIS — Z90.49: ICD-10-CM

## 2021-08-20 DIAGNOSIS — U07.1: Primary | ICD-10-CM

## 2021-08-20 DIAGNOSIS — E87.6: ICD-10-CM

## 2021-08-20 DIAGNOSIS — R07.2: ICD-10-CM

## 2021-08-20 DIAGNOSIS — Z87.442: ICD-10-CM

## 2021-08-20 LAB
ALBUMIN SERPL-MCNC: 3 G/DL (ref 3.4–5)
ALP SERPL-CCNC: 42 U/L (ref 46–116)
ALT SERPL-CCNC: 48 U/L (ref 14–59)
ANION GAP SERPL CALC-SCNC: 10 MMOL/L (ref 6–14)
AST SERPL-CCNC: 39 U/L (ref 15–37)
BASOPHILS # BLD AUTO: 0 X10^3/UL (ref 0–0.2)
BASOPHILS NFR BLD: 0 % (ref 0–3)
BILIRUB DIRECT SERPL-MCNC: 0.2 MG/DL (ref 0–0.2)
BILIRUB SERPL-MCNC: 0.7 MG/DL (ref 0.2–1)
BUN SERPL-MCNC: 7 MG/DL (ref 7–20)
CALCIUM SERPL-MCNC: 8.5 MG/DL (ref 8.5–10.1)
CHLORIDE SERPL-SCNC: 100 MMOL/L (ref 98–107)
CK SERPL-CCNC: 23 U/L (ref 26–192)
CO2 SERPL-SCNC: 29 MMOL/L (ref 21–32)
CREAT SERPL-MCNC: 0.7 MG/DL (ref 0.6–1)
EOSINOPHIL NFR BLD: 0 % (ref 0–3)
EOSINOPHIL NFR BLD: 0 X10^3/UL (ref 0–0.7)
ERYTHROCYTE [DISTWIDTH] IN BLOOD BY AUTOMATED COUNT: 13.2 % (ref 11.5–14.5)
GFR SERPLBLD BASED ON 1.73 SQ M-ARVRAT: 100.4 ML/MIN
GLUCOSE SERPL-MCNC: 100 MG/DL (ref 70–99)
HCT VFR BLD CALC: 34.1 % (ref 36–47)
HGB BLD-MCNC: 12 G/DL (ref 12–15.5)
LIPASE: 97 U/L (ref 73–393)
LYMPHOCYTES # BLD: 0.9 X10^3/UL (ref 1–4.8)
LYMPHOCYTES NFR BLD AUTO: 15 % (ref 24–48)
MAGNESIUM SERPL-MCNC: 1.6 MG/DL (ref 1.8–2.4)
MCH RBC QN AUTO: 29 PG (ref 25–35)
MCHC RBC AUTO-ENTMCNC: 35 G/DL (ref 31–37)
MCV RBC AUTO: 83 FL (ref 79–100)
MONO #: 0.2 X10^3/UL (ref 0–1.1)
MONOCYTES NFR BLD: 4 % (ref 0–9)
NEUT #: 4.6 X10^3/UL (ref 1.8–7.7)
NEUTROPHILS NFR BLD AUTO: 81 % (ref 31–73)
PLATELET # BLD AUTO: 212 X10^3/UL (ref 140–400)
POTASSIUM SERPL-SCNC: 2.8 MMOL/L (ref 3.5–5.1)
PREG TEST PT QUAL: NEGATIVE
PROT SERPL-MCNC: 7.7 G/DL (ref 6.4–8.2)
RBC # BLD AUTO: 4.09 X10^6/UL (ref 3.5–5.4)
SODIUM SERPL-SCNC: 139 MMOL/L (ref 136–145)
WBC # BLD AUTO: 5.7 X10^3/UL (ref 4–11)

## 2021-08-20 PROCEDURE — 99285 EMERGENCY DEPT VISIT HI MDM: CPT

## 2021-08-20 PROCEDURE — 96374 THER/PROPH/DIAG INJ IV PUSH: CPT

## 2021-08-20 PROCEDURE — 71275 CT ANGIOGRAPHY CHEST: CPT

## 2021-08-20 PROCEDURE — 85025 COMPLETE CBC W/AUTO DIFF WBC: CPT

## 2021-08-20 PROCEDURE — 83605 ASSAY OF LACTIC ACID: CPT

## 2021-08-20 PROCEDURE — 96375 TX/PRO/DX INJ NEW DRUG ADDON: CPT

## 2021-08-20 PROCEDURE — 83735 ASSAY OF MAGNESIUM: CPT

## 2021-08-20 PROCEDURE — 80048 BASIC METABOLIC PNL TOTAL CA: CPT

## 2021-08-20 PROCEDURE — 36415 COLL VENOUS BLD VENIPUNCTURE: CPT

## 2021-08-20 PROCEDURE — 84703 CHORIONIC GONADOTROPIN ASSAY: CPT

## 2021-08-20 PROCEDURE — 87040 BLOOD CULTURE FOR BACTERIA: CPT

## 2021-08-20 PROCEDURE — 84484 ASSAY OF TROPONIN QUANT: CPT

## 2021-08-20 PROCEDURE — 80076 HEPATIC FUNCTION PANEL: CPT

## 2021-08-20 PROCEDURE — 71045 X-RAY EXAM CHEST 1 VIEW: CPT

## 2021-08-20 PROCEDURE — 93005 ELECTROCARDIOGRAM TRACING: CPT

## 2021-08-20 PROCEDURE — 83690 ASSAY OF LIPASE: CPT

## 2021-08-20 PROCEDURE — 82550 ASSAY OF CK (CPK): CPT

## 2021-08-20 NOTE — EKG
[facility]

              [address] [place] [address]

Test Date:    2021               Test Time:    17:50:30

Pat Name:     ENDER MERRITT           Department:   

Patient ID:   [id number]           Room:          

Gender:       F                        Technician:   

:          1995               Requested By: ANGI GONZALES

Order Number: [id number]           Reading MD:     

                                 Measurements

Intervals                              Axis          

Rate:         89                       P:            52

GA:           174                      QRS:          30

QRSD:         94                       T:            -8

QT:           364                                    

QTc:          444                                    

                           Interpretive Statements

SINUS RHYTHM

NORMAL ECG

RI6.02

No previous ECG available for comparison

## 2021-08-20 NOTE — EKG
[facility]

              [address] [place] [address]

Test Date:    2021               Test Time:    13:23:43

Pat Name:     ENDER MERRITT           Department:   

Patient ID:   [id number]           Room:          

Gender:       F                        Technician:   

:          1995               Requested By: ANGI GONZALES

Order Number: [id number]           Reading MD:     

                                 Measurements

Intervals                              Axis          

Rate:         108                      P:            139

VA:           156                      QRS:          129

QRSD:         94                       T:            -165

QT:           334                                    

QTc:          451                                    

                           Interpretive Statements

SINUS TACHYCARDIA

ABNORMAL RIGHT AXIS DEVIATION

QRS(T) CONTOUR ABNORMALITY

CONSISTENT WITH HIGH LATERAL MYOCARDIAL DAMAGE

T ABNORMALITY IN INFERIOR LEADS

ABNORMAL ECG

RI6.01

No previous ECG available for comparison

## 2021-08-20 NOTE — RAD
CTA chest with contrast dated 8/20/2021.



No comparison available.



CLINICAL INDICATION: Shortness breath and pleuritic chest pain.



TECHNIQUE:



Continues axial imaging the chest performed following the intravenous administration of 75 cc Omnipaq
ue 350. Study was performed as dedicated PE protocol with thin cut coronal MIPS 3-D reconstruction.

One or more of the following individualized dose reduction techniques were utilized for this examinat
ion:  

1. Automated exposure control  

2. Adjustment of the mA and/or kV according to patient size  

3. Use of iterative reconstruction technique



FINDINGS:



Heart size mildly enlarged. No pericardial effusion. There are mildly enlarged subcarinal, right suzy
r, left hilar lymph nodes measuring up to 12 mm short axis. No axillary or supraclavicular lymphadeno
ricardo. Thyroid gland unremarkable.



Heart size is upper limits of normal. No pericardial effusion. There is an apparent right subclavian 
artery.



Contrast bolus is adequate. Evaluation for pulmonary embolus is limited due to motion artifact. There
 is no evidence of central, lobar or proximal segmental pulmonary embolus. The distal segmental and s
ubsegmental branches are not well evaluated based on technique.



Central airways are patent. There is widespread airspace disease throughout both lungs with periphera
l and groundglass predominance. No pleural effusion. No pneumothorax.



Images of upper abdomen show low-density liver suggesting fatty infiltration. The gallbladder surgica
lly absent.



Bone windows show no acute finding.



IMPRESSION:

1. No evidence of central, lobar or proximal segmental pulmonary embolus. The distal segmental and blue
bsegmental branches are not well evaluated based on technique.

2. Widespread airspace disease, suspicious for Covid 19 pneumonitis. Correlate clinically.

3. Mediastinal and bilateral hilar lymphadenopathy, likely reactive.



Electronically signed by: Rocky Marshall MD (8/20/2021 3:31 PM) [facility]

## 2021-08-20 NOTE — RAD
Exam performed: One view chest



HISTORY: Chest pain



DATE OF SERVICE: 8/20/2021.



COMPARISON: Single view chest from 8/17/2021



Findings and 

impression:



Single AP upright portable view chest is obtained. Heart size and mediastinal silhouette is within li
mits of normal. Pulmonary vascularity is mildly congested. Interval of worsening of diffuse bilateral
 pulmonary infiltrates with small bilateral pleural effusions.



Electronically signed by: Carmela Duran MD (8/20/2021 2:47 PM) [id number]

## 2021-08-20 NOTE — PHYS DOC
Past Medical History


Past Medical History:  Kidney Stone


Past Surgical History:  Cholecystectomy


Additional Past Surgical Histo:  Kidney stone removal


Smoking Status:  Never Smoker


Alcohol Use:  None


Drug Use:  None





General Adult


EDM:


Chief Complaint:  CHEST WALL PAIN





HPI:


HPI:


26-year-old female who denies any past medical history presents the ED with 

complaints of shortness of breath, cough, chest pain with coughing, diffuse 

headache and inability to keep anything down for the past few days. Tested 

positive for Covid on  and was prescribed antibiotics for UTI and pneumonia 

(reports compliance).  No relief with Tylenol or ibuprofen for her headache, is 

asking for analgesia.





Review of Systems:


Review of Systems:


Constitutional:   Denies confusion or chills


Eyes:   Denies change in visual acuity or eye discharge


HENT:   Denies nasal congestion or sore throat. [] 


Respiratory:   Denies hemoptysis or increased work of breathing


Cardiovascular:   Denies chest pain or edema. [] 


GI:   Denies abdominal pain, bloody stools or diarrhea. [] 


:  Denies dysuria or vaginal bleeding


Musculoskeletal:   Denies back pain or joint pain. [] 


Integument:   Denies rash or diaphoresis


Neurologic:   Denies focal weakness or sensory changes. [] 


Endocrine:   Denies polyuria or polydipsia. [] 


Lymphatic:  Denies swollen glands. [] 


Psychiatric:  Denies depression or anxiety. []





Heart Score:


C/O Chest Pain:  Yes


HEART Score for Chest Pain:  








HEART Score for Chest Pain Response (Comments) Value


 


History Slighlty/Non-Suspicious 0


 


ECG Normal 0


 


Age < 45 0


 


Risk Factors                            1 or 2 Risk Factors 1


 


Troponin < Normal Limit 0


 


Total  1








Risk Factors:


Risk Factors:  DM, Current or recent (<one month) smoker, HTN, HLP, family 

history of CAD, obesity.


Risk Scores:


Score 0 - 3:  2.5% MACE over next 6 weeks - Discharge Home


Score 4 - 6:  20.3% MACE over next 6 weeks - Admit for Clinical Observation


Score 7 - 10:  72.7% MACE over next 6 weeks - Early Invasive Strategies





Current Medications:





Current Medications








 Medications


  (Trade)  Dose


 Ordered  Sig/Florence  Start Time


 Stop Time Status Last Admin


Dose Admin


 


 Acetaminophen


  (Tylenol)  650 mg  1X  ONCE  21 14:15


 21 14:16 DC  





 


 Ceftriaxone Sodium


  (Rocephin)  1 gm  1X  ONCE  21 14:15


 21 14:16 DC  





 


 Sodium Chloride  1,000 ml @ 


 1,500 mls/hr  Q40M  21 14:15


 21 15:14   














Allergies:


Allergies:





Allergies








Coded Allergies Type Severity Reaction Last Updated Verified


 


  No Known Drug Allergies    19 No











Physical Exam:


PE:





Constitutional: Well developed, well nourished, no acute distress, non-toxic 

appearance. 


HENT: Normocephalic, atraumatic, slightly dry mucous membranes


Eyes: EOMI, conjunctiva normal, no discharge.  


Neck: Normal range of motion,  supple, 


Cardiovascular: S1/2 present, initially tachycardic on arrival


Lungs & Thorax: Speaking in full sentences, bilateral equal chest rise, no 

tachypnea or increased work of breathing, and 94-95% on room air


Abdomen:  soft, no tenderness, 


Skin: Warm, dry, no erythema, no rash. [] 


Back: No tenderness, no CVA tenderness. [] 


Extremities: No tenderness, no cyanosis, no lower extremity edema


Neurologic: Alert and oriented X 3, normal motor function, normal sensory 

function, no focal deficits noted. []


Psychologic: Affect normal, judgement normal, mood normal. []





Current Patient Data:


Labs:





                                Laboratory Tests








Test


 21


13:38


 


White Blood Count


 5.7 x10^3/uL


(4.0-11.0)


 


Red Blood Count


 4.09 x10^6/uL


(3.50-5.40)


 


Hemoglobin


 12.0 g/dL


(12.0-15.5)


 


Hematocrit


 34.1 %


(36.0-47.0)  L


 


Mean Corpuscular Volume


 83 fL ()





 


Mean Corpuscular Hemoglobin 29 pg (25-35)  


 


Mean Corpuscular Hemoglobin


Concent 35 g/dL


(31-37)


 


Red Cell Distribution Width


 13.2 %


(11.5-14.5)


 


Platelet Count


 212 x10^3/uL


(140-400)


 


Neutrophils (%) (Auto) 81 % (31-73)  H


 


Lymphocytes (%) (Auto) 15 % (24-48)  L


 


Monocytes (%) (Auto) 4 % (0-9)  


 


Eosinophils (%) (Auto) 0 % (0-3)  


 


Basophils (%) (Auto) 0 % (0-3)  


 


Neutrophils # (Auto)


 4.6 x10^3/uL


(1.8-7.7)


 


Lymphocytes # (Auto)


 0.9 x10^3/uL


(1.0-4.8)  L


 


Monocytes # (Auto)


 0.2 x10^3/uL


(0.0-1.1)


 


Eosinophils # (Auto)


 0.0 x10^3/uL


(0.0-0.7)


 


Basophils # (Auto)


 0.0 x10^3/uL


(0.0-0.2)





                                Laboratory Tests


21 13:38








Vital Signs:





                                   Vital Signs








  Date Time  Temp Pulse Resp B/P (MAP) Pulse Ox O2 Delivery O2 Flow Rate FiO2


 


21 13:35 100.5 92 20 98/61 90 Room Air  





 100.5       











EKG:


EKG:


Sinus tachycardia 108 bpm, Limb lead discordance discordance with P wave down in

 lead I and upper in aVR, no ST deviations or ST depressions, normal intervals





1726 sinus rhythm 70 bpm, no axis deviation, normal intervals, T wave inversion 

lead III, no ST elevations or ST depressions





Radiology/Procedures:


Radiology/Procedures:


                                 IMAGING REPORT





                                     Signed





PATIENT: ENDER MERRITT   ACCOUNT: [id number]     MRN#: [id number]


: 1995           LOCATION: ER              AGE: 26


SEX: F                    EXAM DT: 21         ACCESSION#: [id number]


STATUS: REG ER            ORD. PHYSICIAN: ANGI GONZALES DO


REASON: soa, pleuritic cp, r/o pe/


PROCEDURE: CT ANGIOGRAPHY CHEST





CTA chest with contrast dated 2021.





No comparison available.





CLINICAL INDICATION: Shortness breath and pleuritic chest pain.





TECHNIQUE:





Continues axial imaging the chest performed following the intravenous 

administration of 75 cc Omnipaque 350. Study was performed as dedicated PE 

protocol with thin cut coronal MIPS 3-D reconstruction.


One or more of the following individualized dose reduction techniques were 

utilized for this examination:  


1. Automated exposure control  


2. Adjustment of the mA and/or kV according to patient size  


3. Use of iterative reconstruction technique





FINDINGS:





Heart size mildly enlarged. No pericardial effusion. There are mildly enlarged 

subcarinal, right hilar, left hilar lymph nodes measuring up to 12 mm short 

axis. No axillary or supraclavicular lymphadenopathy. Thyroid gland 

unremarkable.





Heart size is upper limits of normal. No pericardial effusion. There is an 

apparent right subclavian artery.





Contrast bolus is adequate. Evaluation for pulmonary embolus is limited due to 

motion artifact. There is no evidence of central, lobar or proximal segmental 

pulmonary embolus. The distal segmental and subsegmental branches are not well 

evaluated based on technique.





Central airways are patent. There is widespread airspace disease throughout both

 lungs with peripheral and groundglass predominance. No pleural effusion. No 

pneumothorax.





Images of upper abdomen show low-density liver suggesting fatty infiltration. 

The gallbladder surgically absent.





Bone windows show no acute finding.





IMPRESSION:


1. No evidence of central, lobar or proximal segmental pulmonary embolus. The 

distal segmental and subsegmental branches are not well evaluated based on 

technique.


2. Widespread airspace disease, suspicious for Covid 19 pneumonitis. Correlate 

clinically.


3. Mediastinal and bilateral hilar lymphadenopathy, likely reactive.





Electronically signed by: Rocky Marshall MD (2021 3:31 PM) [facility]














DICTATED and SIGNED BY:     ROCKY MARSHALL MD


DATE:     21 [id number] 0








[]IMAGING REPORT





                                     Signed





PATIENT: ENDER MERRITT   ACCOUNT: [id number]     MRN#: [id number]


: 1995           LOCATION: ER              AGE: 26


SEX: F                    EXAM DT: 21         ACCESSION#: [id number]


STATUS: REG ER            ORD. PHYSICIAN: ANGI GONZALES DO


REASON: soa, pleuritic cp, r/o pe/


PROCEDURE: CT ANGIOGRAPHY CHEST





CTA chest with contrast dated 2021.





No comparison available.





CLINICAL INDICATION: Shortness breath and pleuritic chest pain.





TECHNIQUE:





Continues axial imaging the chest performed following the intravenous 

administration of 75 cc Omnipaque 350. Study was performed as dedicated PE 

protocol with thin cut coronal MIPS 3-D reconstruction.


One or more of the following individualized dose reduction techniques were 

utilized for this examination:  


1. Automated exposure control  


2. Adjustment of the mA and/or kV according to patient size  


3. Use of iterative reconstruction technique





FINDINGS:





Heart size mildly enlarged. No pericardial effusion. There are mildly enlarged 

subcarinal, right hilar, left hilar lymph nodes measuring up to 12 mm short 

axis. No axillary or supraclavicular lymphadenopathy. Thyroid gland unre

markable.





Heart size is upper limits of normal. No pericardial effusion. There is an 

apparent right subclavian artery.





Contrast bolus is adequate. Evaluation for pulmonary embolus is limited due to 

motion artifact. There is no evidence of central, lobar or proximal segmental 

pulmonary embolus. The distal segmental and subsegmental branches are not well 

evaluated based on technique.





Central airways are patent. There is widespread airspace disease throughout both

 lungs with peripheral and groundglass predominance. No pleural effusion. No 

pneumothorax.





Images of upper abdomen show low-density liver suggesting fatty infiltration. 

The gallbladder surgically absent.





Bone windows show no acute finding.





IMPRESSION:


1. No evidence of central, lobar or proximal segmental pulmonary embolus. The 

distal segmental and subsegmental branches are not well evaluated based on 

technique.


2. Widespread airspace disease, suspicious for Covid 19 pneumonitis. Correlate 

clinically.


3. Mediastinal and bilateral hilar lymphadenopathy, likely reactive.





Electronically signed by: Rocky Marshall MD (2021 3:31 PM) [facility] [place]














DICTATED and SIGNED BY:     ROCKY MARSHALL MD


DATE:     21 [id number] 0





Course & Med Decision Making:


Course & Med Decision Making


Pertinent Labs and Imaging studies reviewed. (See chart for details)





Concern for pleuritic chest pain in the setting of dyspnea, nausea/vomiting, 

hyokalemia/hypomagnesemia and COVID-19 infection.  Chest x-ray concerning for 

pleural effusions but no effusion was seen on CT of the chest.  No pulmonary 

infiltrates or pulmonary emboli.  Patient not requiring any supplemental oxygen-

was 95% on RA during my exam.  I encouraged antibiotic compliance as previously 

prescribed.  Will recommend NSAIDs and lidocaine patches for analgesia.  

Potassium and magnesium replacement started in ED and will have patient follow-

up with primary care physician to repeat her potassium magnesium levels-we'll 

also prescribe Zofran ODT as needed for nausea and vomiting.  N/Vresolved in ED-

patient tolerating oral intake.  Encouraged antipyretics. Will discharge home 

with strict ED return precautions were given for neurologic deficits, syncope, 

increased work of breathing or chest pain. Encouraged urgent outpatient follow-

up with PMD for urgent follow-up.  Life-threatening processes were considered 

but are low suspicion at this time, given history, physical exam and ED workup. 

Pt was educated on all prescription medications and adverse effects.  All 

patient's questions were answered and pt was stable at time of discharge.





Life/limb-threatening differential includes but is not limited to, ACS, 

dysrhythmia, pneumothorax or hemothorax, pulmonary embolus, pneumonia, bronchoc

onstriction, pulmonary edema, angioedema, epiglottitis, tracheitis, Samuel's 

angina, RPA/PTA, anaphylaxis, angioedema, cardiac tamponade or murmurs, 

pericarditis, myocarditis, poisoning or toxicity, sepsis or 

autoimmune/neurologic disease.





I have spoken with the patient and/or caregivers.  I explained the patient's 

condition, diagnoses and treatment plan based on the information available to me

 at this time.  I have answered the patient and/or caregiver's questions and 

addressed any concerns.  The patient and/or caregivers have a good understanding

 of patient's diagnosis, condition and treatment plan as can be expected at this

 point.  Vital signs have been stable.  Patient's condition is stable and 

appropriate for discharge from the emergency department. 





Patient will pursue further outpatient evaluation with primary care physician or

 other designated or consulting physician as outlined in the discharge 

instructions.  The patient and/or caregivers are agreeable to this plan of care 

and follow-up instructions have been explained in detail.  The patient and/or 

caregivers have received these instructions in written form and have expressed 

an understanding of the discharge instructions.  The patient and/or caregivers 

are aware that any significant change of condition or worsening of symptoms 

should prompt immediate return to this or the closest emergency department or 

call to 911.





Gerda Disclaimer:


Dragon Disclaimer:


This electronic medical record was generated, in whole or in part, using a voice

 recognition dictation system.





Departure


Departure


Impression:  


   Primary Impression:  


   COVID-19 virus infection


   Additional Impressions:  


   Dyspnea


   Pleuritic chest pain


   Hypokalemia


   Nausea & vomiting


Disposition:  01 HOME / SELF CARE / HOMELESS


Condition:  STABLE


Referrals:  


NO PCP (PCP)


Follow-up with your primary care physician in 24 to 48 hours to recheck 

potassium and magnesium


Patient Instructions:  Hypokalemia, Pleurisy





Additional Instructions:  


Return to ED immediately if your oxygen level drops below 90% (purchase a pulse 

oximetry at a medical supply store), difficulties breathing including rapid 

breathing or increased work of breathing (skin sucking under ribs), chest pain 

or stroke-like symptoms (facial droop, speech changes, arm/leg weakness).





You have been tested for or diagnosed with COVID-19. It is an infection caused 

by a new type 


of coronavirus. COVID-19 will cause cold-like or mild flu symptoms in most. It 

can cause 


more severe symptoms like problems breathing in some.





There is no treatment for COVID-19. The body will clear the infection over time.

 Self-care 


will help to ease discomfort.





Steps to Take:


Self-Care


Rest as needed. Healthy habits may help you feel better. Steps include:





Choose healthy foods including fruits and vegetables. Drink water throughout the

 day.


Get plenty of sleep each night.


If you smoke, try to quit. It may ease breathing.


Avoid alcohol.


Keep Others Healthy


The virus can spread to others. Droplets are released every time you sneeze or 

cough. The 


droplets can get into the mouth, nose, or eyes of people near you and lead to 

infection. To 


lower the chances of spreading COVID-19 to others:





Stay at home until your doctor has said it is safe to leave. If you tested 

positive this 


will mean staying isolated until both of the following are true:





At least 7 days have passed since the start of illness.


You are free of fever for at least 72 hours without the use of medicine.


During this time:





 - Avoid public areas, events, or transportation. Do not return to work or scho

[employer] until your 


doctor has said it is safe to do so.


 - Call ahead if you need to go to a medical center. Let them know you may have 

COVID-19. It 


will help them guide you where to go. They may also ask you to wear a facemask 

when you come 


to the office.


 - If you call for emergency medical services, let them know you may have COVID-

19.


While at home:





 - Try to avoid close contact with others. Stay about 6 feet away.


 - If possible, spend most of your time in a separate room from others.


 - Use a face mask if you will be in close contact with others such as sharing a

 room or 


vehicle.


 - Have someone wipe down common surfaces in the home. Use household  

every day on 


areas like doorknobs, counters, or sinks.


 - Cough or sneeze into a tissue. Throw the tissue away right after use. If a 

tissue is not 


available, cough or sneeze into your elbow.


 - Wash your hands often. Wash them after sneezing or coughing. Use soap and 

water and wash 


for at least 20 seconds. Alcohol based hand  can be used if soap and 

water is not 


available.


 - Do not prepare food for others. Avoid sharing personal items like forks, 

spoons, or 


toothbrushes.


 - Avoid close contact with pets while you are sick. There is no evidence of the

 virus 


passing to pets. This is a safety step until more is known about this virus.


Isolation can be frustrating. Social interaction can help. Keep in touch with 

friends and 


family through phone and tech options. You can still interact with others in 

your home, just 


keep a safe distance of about 6 feet.





Follow-up:


Your doctors office will check in with you to see if there are any changes in 

your health. 


You may be asked to keep track of symptoms to share with them. They will also 

let you know 


when you are clear to be in public again.





Problems to Look Out For:


Contact your doctor if your recovery is not going as you expect. Get emergency 

care if you 


have problems such as:





 - Trouble breathing


 - Nonstop chest pain or pressure


 - Changes in awareness, confusion, or problems waking


 - Lips or face have bluish color


 - Worsening of symptoms


If you think you have an emergency, call for emergency medical services right 

away.





As taken from [facility]


Scripts


Ondansetron (ONDANSETRON ODT) 4 Mg Tab.rapdis


1 TAB PO PRN Q6-8HRS, #16 TAB


   Prov: ANGI GONZALES DO         21 


Ibuprofen (IBUPROFEN) 600 Mg Tablet


600 MG PO PRN Q6HRS PRN for PAIN, #20 TAB


   take with food or milk


   Prov: ANGI GONZALES DO         21 


Lidocaine (Lido Gera) 1 Each Adh..patch


1 EACH TP DAILY for 5 Days, #5 PATCH


   Apply 1 patch for 12 hours, remove for another 12 hours.


   May repeat, 1 patch per day as instructed above.


   Prov: ANGI GONZALES DO         21











ANGI GONZALES DO               Aug 20, 2021 14:24

## 2022-01-23 ENCOUNTER — HOSPITAL ENCOUNTER (EMERGENCY)
Dept: HOSPITAL 61 - ER | Age: 27
Discharge: HOME | End: 2022-01-23
Payer: COMMERCIAL

## 2022-01-23 VITALS — BODY MASS INDEX: 46.09 KG/M2 | WEIGHT: 250.45 LBS | HEIGHT: 62 IN

## 2022-01-23 VITALS — DIASTOLIC BLOOD PRESSURE: 99 MMHG | SYSTOLIC BLOOD PRESSURE: 153 MMHG

## 2022-01-23 DIAGNOSIS — Z20.822: ICD-10-CM

## 2022-01-23 DIAGNOSIS — J01.00: Primary | ICD-10-CM

## 2022-01-23 LAB
INFLUENZA A PATIENT: NEGATIVE
INFLUENZA B PATIENT: NEGATIVE

## 2022-01-23 PROCEDURE — 87428 SARSCOV & INF VIR A&B AG IA: CPT

## 2022-01-23 PROCEDURE — U0003 INFECTIOUS AGENT DETECTION BY NUCLEIC ACID (DNA OR RNA); SEVERE ACUTE RESPIRATORY SYNDROME CORONAVIRUS 2 (SARS-COV-2) (CORONAVIRUS DISEASE [COVID-19]), AMPLIFIED PROBE TECHNIQUE, MAKING USE OF HIGH THROUGHPUT TECHNOLOGIES AS DESCRIBED BY CMS-2020-01-R: HCPCS

## 2022-01-23 PROCEDURE — 99283 EMERGENCY DEPT VISIT LOW MDM: CPT

## 2022-01-23 NOTE — PHYS DOC
Past Medical History


Past Medical History:  No Pertinent History


Past Surgical History:  Cholecystectomy


Additional Past Surgical Histo:  R WRIST SX


Smoking Status:  Never Smoker


Alcohol Use:  None


Drug Use:  None





General Adult


EDM:


Chief Complaint:  Congestion





HPI:


HPI:





Patient is a 26-year-old female presents to the emergency department complaining

of waking up yesterday with a stuffy nose, denies fever or chills, denies throat

discomfort, denies cough, denies ear pain or throat pain.  Patient denies 

nausea, abdominal pain, denies other physical complaints or physical concerns.  

Patient reports she tried Vicks sinus medication yesterday with minimal relief. 

Patient reports her last menstrual cycle ended 3 days ago with normal duration 

of flow, denies taking prescription medications at home, denies allergies to 

medications.  Patient reports she sees Dr. Caceres for primary care.  Patient 

denies receiving the COVID-19 virus vaccination series or flu vaccination for 

this season.





Review of Systems:


Review of Systems:


14 body systems of review of systems have been reviewed.  See HPI for pertinent 

positives and negative responses, otherwise all other systems are negative, 

nonpertinent or noncontributory.


Constitutional: Negative except as outlined in HPI above.


Skin: Negative except as outlined in HPI above.


Eyes:   Negative except as outlined in HPI above.


HENT: Negative except as outlined in HPI above.


Respiratory:   Negative except as outlined in HPI above.


Cardiovascular:   Negative except as outlined in HPI above.


GI:   Negative except as outlined in HPI above.


:  Negative except as outlined in HPI above.


Musculoskeletal:   Negative except as outlined in HPI above.


Integument:   Negative except as outlined in HPI above.


Neurologic:   Negative except as outlined in HPI above.


Endocrine:   Negative except as outlined in HPI above.


Lymphatic:  Negative except as outlined in HPI above.


Psychiatric:  Negative except as outlined in HPI above.





Heart Score:


C/O Chest Pain:  No


Risk Factors:


Risk Factors:  DM, Current or recent (<one month) smoker, HTN, HLP, family 

history of CAD, obesity.


Risk Scores:


Score 0 - 3:  2.5% MACE over next 6 weeks - Discharge Home


Score 4 - 6:  20.3% MACE over next 6 weeks - Admit for Clinical Observation


Score 7 - 10:  72.7% MACE over next 6 weeks - Early Invasive Strategies





Current Medications:





Current Medications








 Medications


  (Trade)  Dose


 Ordered  Sig/Maksim  Start Time


 Stop Time Status Last Admin


Dose Admin


 


 Ibuprofen


  (Motrin)  600 mg  1X  ONCE  1/23/22 14:45


 1/23/22 14:46 DC 1/23/22 14:55


600 MG


 


 Pseudoephedrine


 HCl


  (Sudafed)  60 mg  PRN Q6HRS  PRN  1/23/22 14:45


    1/23/22 15:00


60 MG











Allergies:


Allergies:





Allergies








Coded Allergies Type Severity Reaction Last Updated Verified


 


  No Known Drug Allergies    10/6/17 No











Physical Exam:


PE:





Constitutional: Well developed, well nourished, no acute distress, non-toxic 

appearance.  26-year-old female in no apparent distress.


HENT: Normocephalic, atraumatic.  Oropharynx moist, pink, no deep tissue 

infectious process appreciated, patient speaking in normal voice tones, no 

uvular edema or deviation, no laryngeal edema, scant postnasal drip, bilateral 

nasal turbinates boggy without drainage, bilateral TMs within normal limits, 

intact, no lymphadenopathy of the head or neck appreciated.  


Eyes: Conjunctiva normal, no discharge.


Neck: Normal range of motion, no stridor.


Cardiovascular: No cyanosis appreciated, distal cap refill less than 2 seconds.


Lungs & Thorax: Patient is in no respiratory distress, no audible adventitious 

lung sounds appreciated.  Lung sounds clear to auscultate all lung fields.


Abdomen: Nontender, no abnormalities noted.


Skin: Warm, dry, no erythema, no rash.  


Back: No tenderness, no deformities.


Extremities: No tenderness, no cyanosis, no clubbing, ROM intact, no edema.  


Neurologic: Alert and oriented X 3, normal motor function, normal sensory 

function, no focal deficits noted. 


Psychologic: Affect normal, judgement normal, mood normal.





Current Patient Data:


Labs:





                                Laboratory Tests








Test


 1/23/22


14:35


 


Influenza Type A Antigen


 Negative


(NEGATIVE)


 


Influenza Type B Antigen


 Negative


(NEGATIVE)


 


SARS-CoV-2 Antigen (Rapid)


 Negative


(NEGATIVE)








Vital Signs:





                                   Vital Signs








  Date Time  Temp Pulse Resp B/P (MAP) Pulse Ox O2 Delivery O2 Flow Rate FiO2


 


1/23/22 14:53  84 18 153/92 (112) 97 Room Air  


 


1/23/22 14:25 97.7       





 97.7       











EKG:


EKG:


[]





Radiology/Procedures:


Radiology/Procedures:


[]





Course & Med Decision Making:


Course & Med Decision Making


Pertinent Labs and Imaging studies reviewed. (See chart for details)





26-year-old female, vital signs reviewed, presents emergency department 

concerning stuffy nose at home.  Physical examination consistent with sinusitis 

most likely viral component, will order rapid COVID and flu testing, COVID 

testing for PCR.  We will give 60 mg Sudafed while labs are pending.








Patient's rapid flu and COVID testing negative, upon reevaluation of the adin

ent, patient reports some relief with medication given, discussed with patient 

using over-the-counter sinus congestion medications, stay well-hydrated, follow-

up with primary care this next week for ongoing symptoms, discussed with patient

 negative flu and COVID findings, PCR is pending, patient gave verbal 

understanding of and is amenable to ED discharge planning.





Discussed with the patient all findings and diagnostic testing as well as the 

need to follow-up with their primary care provider for further evaluation and 

treatment or return to the ED if any new or worsening symptoms.  Strict return 

precautions were also discussed at length, the patient voiced understanding and 

agreement with the discharge planning.  The patient was nontoxic in appearance, 

in no apparent distress, and hemodynamically stable at the time of disposition.





Dragon Disclaimer:


Dragon Disclaimer:


This electronic medical record was generated, in whole or in part, using a voice

 recognition dictation system.





Departure


Departure


Impression:  


   Primary Impression:  


   Sinusitis


   Qualified Codes:  J01.00 - Acute maxillary sinusitis, unspecified


Disposition:  01 HOME / SELF CARE / HOMELESS


Condition:  GOOD


Referrals:  


NO PCP (PCP)








MARVIN CACERES MD


Patient Instructions:  Sinusitis





Additional Instructions:  


You were seen today in the emergency department for a stuffy nose, your COVID 

and flu testing came back negative, you do have a COVID PCR test pending and 

results should be available within the next 24 to 48 hours.  As we discussed, 

your sinusitis presentation is most likely viral, please treat with adequate 

fluid intake, you may treat with over-the-counter sinus medications.  Please see

 your doctor this week for ongoing symptoms.  Return turn to the emergency 

department for worsening symptoms or other concerns.  Thank you for visiting our

 Emergency Department.  It was a pleasure taking care of you today in the 

emergency department and we appreciate you trusting us with your care. If any 

additional problems come up don't hesitate to return to visit us. Please follow 

up with your primary care provider so they can plan additional care if needed 

and know about the problem that you had. If symptoms worsen come back to the 

Emergency Department. Any concerning symptoms that start such as chest pain, 

shortness of air, weakness or numbness on one side of the body, running high 

fevers or any other concerning symptoms return to the ER.











ALANA SHELTON       Jan 23, 2022 15:50

## 2022-04-17 ENCOUNTER — HOSPITAL ENCOUNTER (EMERGENCY)
Dept: HOSPITAL 61 - ER | Age: 27
Discharge: HOME | End: 2022-04-17
Payer: COMMERCIAL

## 2022-04-17 VITALS — SYSTOLIC BLOOD PRESSURE: 101 MMHG | DIASTOLIC BLOOD PRESSURE: 63 MMHG

## 2022-04-17 VITALS — BODY MASS INDEX: 46.09 KG/M2 | HEIGHT: 62 IN | WEIGHT: 250.45 LBS

## 2022-04-17 DIAGNOSIS — Z90.49: ICD-10-CM

## 2022-04-17 DIAGNOSIS — Z87.442: ICD-10-CM

## 2022-04-17 DIAGNOSIS — M51.36: Primary | ICD-10-CM

## 2022-04-17 LAB
ALBUMIN SERPL-MCNC: 3.6 G/DL (ref 3.4–5)
ALBUMIN/GLOB SERPL: 0.8 {RATIO} (ref 1–1.7)
ALP SERPL-CCNC: 53 U/L (ref 46–116)
ALT SERPL-CCNC: 53 U/L (ref 14–59)
ANION GAP SERPL CALC-SCNC: 11 MMOL/L (ref 6–14)
AST SERPL-CCNC: 34 U/L (ref 15–37)
BACTERIA #/AREA URNS HPF: (no result) /HPF
BASOPHILS # BLD AUTO: 0 X10^3/UL (ref 0–0.2)
BASOPHILS NFR BLD: 1 % (ref 0–3)
BILIRUB SERPL-MCNC: 0.6 MG/DL (ref 0.2–1)
BUN SERPL-MCNC: 13 MG/DL (ref 7–20)
BUN/CREAT SERPL: 14 (ref 6–20)
CALCIUM SERPL-MCNC: 9.4 MG/DL (ref 8.5–10.1)
CHLORIDE SERPL-SCNC: 99 MMOL/L (ref 98–107)
CO2 SERPL-SCNC: 27 MMOL/L (ref 21–32)
CREAT SERPL-MCNC: 0.9 MG/DL (ref 0.6–1)
EOSINOPHIL NFR BLD: 0.1 X10^3/UL (ref 0–0.7)
EOSINOPHIL NFR BLD: 2 % (ref 0–3)
ERYTHROCYTE [DISTWIDTH] IN BLOOD BY AUTOMATED COUNT: 13.9 % (ref 11.5–14.5)
GFR SERPLBLD BASED ON 1.73 SQ M-ARVRAT: 75.1 ML/MIN
GLUCOSE SERPL-MCNC: 146 MG/DL (ref 70–99)
HCT VFR BLD CALC: 34.1 % (ref 36–47)
HGB BLD-MCNC: 11.6 G/DL (ref 12–15.5)
HYALINE CASTS #/AREA URNS LPF: (no result) /HPF
LIPASE: 86 U/L (ref 73–393)
LYMPHOCYTES # BLD: 1.7 X10^3/UL (ref 1–4.8)
LYMPHOCYTES NFR BLD AUTO: 25 % (ref 24–48)
MCH RBC QN AUTO: 28 PG (ref 25–35)
MCHC RBC AUTO-ENTMCNC: 34 G/DL (ref 31–37)
MCV RBC AUTO: 83 FL (ref 79–100)
MONO #: 0.5 X10^3/UL (ref 0–1.1)
MONOCYTES NFR BLD: 8 % (ref 0–9)
NEUT #: 4.4 X10^3/UL (ref 1.8–7.7)
NEUTROPHILS NFR BLD AUTO: 65 % (ref 31–73)
PLATELET # BLD AUTO: 238 X10^3/UL (ref 140–400)
POTASSIUM SERPL-SCNC: 3.5 MMOL/L (ref 3.5–5.1)
PROT SERPL-MCNC: 8 G/DL (ref 6.4–8.2)
RBC # BLD AUTO: 4.14 X10^6/UL (ref 3.5–5.4)
RBC #/AREA URNS HPF: 0 /HPF (ref 0–2)
SODIUM SERPL-SCNC: 137 MMOL/L (ref 136–145)
WBC # BLD AUTO: 6.7 X10^3/UL (ref 4–11)
WBC #/AREA URNS HPF: (no result) /HPF (ref 0–4)

## 2022-04-17 PROCEDURE — 87147 CULTURE TYPE IMMUNOLOGIC: CPT

## 2022-04-17 PROCEDURE — 83690 ASSAY OF LIPASE: CPT

## 2022-04-17 PROCEDURE — 85025 COMPLETE CBC W/AUTO DIFF WBC: CPT

## 2022-04-17 PROCEDURE — 96374 THER/PROPH/DIAG INJ IV PUSH: CPT

## 2022-04-17 PROCEDURE — 80053 COMPREHEN METABOLIC PANEL: CPT

## 2022-04-17 PROCEDURE — 74177 CT ABD & PELVIS W/CONTRAST: CPT

## 2022-04-17 PROCEDURE — 81001 URINALYSIS AUTO W/SCOPE: CPT

## 2022-04-17 PROCEDURE — 99285 EMERGENCY DEPT VISIT HI MDM: CPT

## 2022-04-17 PROCEDURE — 87086 URINE CULTURE/COLONY COUNT: CPT

## 2022-04-17 PROCEDURE — 36415 COLL VENOUS BLD VENIPUNCTURE: CPT

## 2022-04-17 PROCEDURE — 81025 URINE PREGNANCY TEST: CPT

## 2022-04-17 NOTE — RAD
CT lumbar spine without contrast. CT abdomen and pelvis with contrast.



PQRS statement: CT scans at this facility use dose reduction including either automated exposure cont
rol, iterative reconstructions, and /or weight based radiation dosing via mA and kV modification when
 appropriate to reduce radiation dose to as low as reasonably achievable.



HISTORY: Low back pain. Abdominal pain.



Contrast: 75 mL Omnipaque 300 intravenous contrast.



CT lumbar spine findings: Lumbar vertebral body height and alignment is intact. No fracture. No spond
ylolysis defect. Mild posterior disc space narrowing with shallow disc bulges at L4-L5 and L5-S1 ther
e may be a disc herniation at L5-S1, mild spinal canal stenosis is possible. Paraspinal tissues are n
ormal.



CT abdomen findings: Lung bases and bones are unremarkable. Hyperdense liver may be fatty. Mild promi
nence of the common bile duct typical after cholecystectomy. Pancreas, adrenals, kidneys and spleen a
re unremarkable. The pancreas is normal. No obstruction or inflammation of the GI tract. No abdominal
 fluid or adenopathy.



CT pelvis findings: Uterus, ovaries, bladder, rectum and bones are unremarkable. No pelvic fluid or a
denopathy.



IMPRESSION:

1. No acute osseous injury of the lumbar spine. Lower lumbar disc disease. See above.



2. No acute abnormality in the abdomen or pelvis. The appendix is normal.



Electronically signed by: Phi Mayes MD (4/17/2022 3:04 AM) [facility]

## 2022-04-17 NOTE — PHYS DOC
Past Medical History


Past Medical History:  Kidney Stone


Past Surgical History:  Cholecystectomy


Additional Past Surgical Histo:  R WRIST SX


Smoking Status:  Never Smoker


Alcohol Use:  None


Drug Use:  None





General Adult


EDM:


Chief Complaint:  BACK PAIN - NO INJURY





HPI:


HPI:





Patient is a 26  year old female who present to ER for evaluation of low back 

pain.  Patient has chronic low back pain to me going on for a while.  Patient 

denies any injury.  Patient said tonight the pain became more severe so she came

here for evaluation.  Patient denies any bowel or bladder incontinence.  Patient

also complaining abdominal pain.  Patient said before she came home mom gave her

2 Flexeril to take.  Patient denies any fever, no bowel or bladder incontinence.





Review of Systems:


Review of Systems:


Constitutional:   Denies fever or chills. []


Eyes:   Denies change in visual acuity. []


HENT:   Denies nasal congestion or sore throat. [] 


Respiratory:   Denies cough or shortness of breath. [] 


Cardiovascular:   Denies chest pain or edema. [] 


GI: Positive for abdominal pain, no nausea, vomiting, bloody stools or diarrhea.

 [] 


:  Denies dysuria. [] 


Musculoskeletal:   Positive for low back pain.


Integument:   Denies rash. [] 


Neurologic:   Denies headache, focal weakness or sensory changes. [] 


Endocrine:   Denies polyuria or polydipsia. [] 


Lymphatic:  Denies swollen glands. [] 


Psychiatric:  Denies depression or anxiety. []





Heart Score:


C/O Chest Pain:  N/A


Risk Factors:


Risk Factors:  DM, Current or recent (<one month) smoker, HTN, HLP, family 

history of CAD, obesity.


Risk Scores:


Score 0 - 3:  2.5% MACE over next 6 weeks - Discharge Home


Score 4 - 6:  20.3% MACE over next 6 weeks - Admit for Clinical Observation


Score 7 - 10:  72.7% MACE over next 6 weeks - Early Invasive Strategies





Allergies:


Allergies:





Allergies








Coded Allergies Type Severity Reaction Last Updated Verified


 


  No Known Drug Allergies    10/6/17 No











Physical Exam:


PE:





Constitutional: Well developed, well nourished, no acute distress, non-toxic 

appearance.  Obese


HENT: Normocephalic, atraumatic, bilateral external ears normal, oropharynx 

moist, no oral exudates, nose normal. []


Eyes: PERRLA, EOMI, conjunctiva normal, no discharge. [] 


Neck: Normal range of motion, no tenderness, supple, no stridor. [] 


Cardiovascular:Heart rate regular rhythm, no murmur []


Lungs & Thorax:  Bilateral breath sounds clear to auscultation []


Abdomen: Bowel sounds normal, soft, no tenderness, no masses, no pulsatile gudelia

s. [] 


Skin: Warm, dry, no erythema, no rash. [] 


Back: Tender to palpation at L3,  L4-L5 area


Extremities: No tenderness, no cyanosis, no clubbing, ROM intact, no edema. [] 


Neurologic: Alert and oriented X 3, normal motor function, normal sensory 

function, no focal deficits noted. []


Psychologic: Affect normal, judgement normal, mood normal. []





Current Patient Data:


Labs:





Laboratory Tests








Test


 22


01:45 22


01:47 22


01:49


 


Urine Collection Type Unknown   


 


Urine Color (Auto) Yellow   


 


Urine Turbidity Clear   


 


Urine pH (Auto) 5.0   


 


Urine Specific Gravity 1.027   


 


Urine Protein (Auto) Negative mg/dL   


 


Urine Glucose (Auto)(UA) Negative mg/dL   


 


Urine Ketones (Auto) Trace mg/dL   


 


Urine Blood (Auto) Negative   


 


Urine Nitrite Negative   


 


Urine Bilirubin (Auto) Negative   


 


Urine Urobilinogen (Auto) Normal mg/dL   


 


Urine Leukocyte Esterase


(Auto) Small 


 


 





 


Urine RBC 0 /HPF   


 


Urine WBC 1-4 /HPF   


 


Urine Squamous Epithelial


Cells Mod /LPF 


 


 





 


Urine Bacteria Few /HPF   


 


Urine Hyaline Casts Few /HPF   


 


Urine Mucus Mod /LPF   


 


Bedside Urine HCG, Qualitative  Hcg negative  


 


White Blood Count   6.7 x10^3/uL 


 


Red Blood Count   4.14 x10^6/uL 


 


Hemoglobin   11.6 g/dL 


 


Hematocrit   34.1 % 


 


Mean Corpuscular Volume   83 fL 


 


Mean Corpuscular Hemoglobin   28 pg 


 


Mean Corpuscular Hemoglobin


Concent 


 


 34 g/dL 





 


Red Cell Distribution Width   13.9 % 


 


Platelet Count   238 x10^3/uL 


 


Neutrophils (%) (Auto)   65 % 


 


Lymphocytes (%) (Auto)   25 % 


 


Monocytes (%) (Auto)   8 % 


 


Eosinophils (%) (Auto)   2 % 


 


Basophils (%) (Auto)   1 % 


 


Neutrophils # (Auto)   4.4 x10^3/uL 


 


Lymphocytes # (Auto)   1.7 x10^3/uL 


 


Monocytes # (Auto)   0.5 x10^3/uL 


 


Eosinophils # (Auto)   0.1 x10^3/uL 


 


Basophils # (Auto)   0.0 x10^3/uL 


 


Sodium Level   137 mmol/L 


 


Potassium Level   3.5 mmol/L 


 


Chloride Level   99 mmol/L 


 


Carbon Dioxide Level   27 mmol/L 


 


Anion Gap   11 


 


Blood Urea Nitrogen   13 mg/dL 


 


Creatinine   0.9 mg/dL 


 


Estimated GFR


(Cockcroft-Gault) 


 


 75.1 





 


BUN/Creatinine Ratio   14 


 


Glucose Level   146 mg/dL 


 


Calcium Level   9.4 mg/dL 


 


Total Bilirubin   0.6 mg/dL 


 


Aspartate Amino Transf


(AST/SGOT) 


 


 34 U/L 





 


Alanine Aminotransferase


(ALT/SGPT) 


 


 53 U/L 





 


Alkaline Phosphatase   53 U/L 


 


Total Protein   8.0 g/dL 


 


Albumin   3.6 g/dL 


 


Albumin/Globulin Ratio   0.8 


 


Lipase   86 U/L 








Current Medications








 Medications


  (Trade)  Dose


 Ordered  Sig/Maksim


 Route


 PRN Reason  Start Time


 Stop Time Status Last Admin


Dose Admin


 


 Ketorolac


 Tromethamine


  (Toradol 30mg


 Vial)  30 mg  1X  ONCE


 IVP


   22 02:15


 22 02:16 DC  





 


 Iohexol


  (Omnipaque 300


 Mg/ml)  75 ml  1X  ONCE


 PO


   22 02:30


 22 02:31 DC 22 02:34





 


 Info


  (CONTRAST GIVEN


 -- Rx MONITORING)  1 each  PRN DAILY  PRN


 [facility]


 SEE COMMENTS  22 02:30


 22 02:29   











                                Laboratory Tests








Test


 22


01:47


 


POC Urine HCG, Qualitative


 Hcg negative


(Negative)











EKG:


EKG:


[]





Radiology/Procedures:


Radiology/Procedures:


[][facility]


                    [address] [place] [address]


                                 [phone]


                                        


                                 IMAGING REPORT





                                     Signed





PATIENT: DENISE PROCTOR   ACCOUNT: [id number]     MRN#: [id number]


: 1995           LOCATION: ER              AGE: 26


SEX: F                    EXAM DT: 22         ACCESSION#: [id number]


STATUS: REG ER            ORD. PHYSICIAN: MEL BELCHER DO


REASON: abdominal pain, back pain;OMNI 300, 75ML


PROCEDURE: CT ABD PELV W/ IV CONTRST ONLY





CT lumbar spine without contrast. CT abdomen and pelvis with contrast.





PQRS statement: CT scans at this facility use dose reduction including either 

automated exposure control, iterative reconstructions, and /or weight based 

radiation dosing via mA and kV modification when appropriate to reduce radiation

 dose to as low as reasonably achievable.





HISTORY: Low back pain. Abdominal pain.





Contrast: 75 mL Omnipaque 300 intravenous contrast.





CT lumbar spine findings: Lumbar vertebral body height and alignment is intact. 

No fracture. No spondylolysis defect. Mild posterior disc space narrowing with 

shallow disc bulges at L4-L5 and L5-S1 there may be a disc herniation at L5-S1, 

mild spinal canal stenosis is possible. Paraspinal tissues are normal.





CT abdomen findings: Lung bases and bones are unremarkable. Hyperdense liver may

 be fatty. Mild prominence of the common bile duct typical after 

cholecystectomy. Pancreas, adrenals, kidneys and spleen are unremarkable. The 

pancreas is normal. No obstruction or inflammation of the GI tract. No abdominal

 fluid or adenopathy.





CT pelvis findings: Uterus, ovaries, bladder, rectum and bones are unremarkable.

 No pelvic fluid or adenopathy.





IMPRESSION:


1. No acute osseous injury of the lumbar spine. Lower lumbar disc disease. See 

above.





2. No acute abnormality in the abdomen or pelvis. The appendix is normal.





Electronically signed by: Maggy Mayes MD (2022 3:04 AM) [facility] [place]














DICTATED and SIGNED BY:     MAGGY MAYES MD


DATE:     22





Course & Med Decision Making:


Course & Med Decision Making


Pertinent Labs and Imaging studies reviewed. (See chart for details)





Patient is a 26-year-old female who presented to ER for evaluation of abdominal 

pain and low back pain.  CT scan of the abdomen pelvis with lumbar spine show 

some disc problem L3-L4 L5-S1 area.  Patient will be given pain medication to 

take at home, she did follow-up with her family physician for outpatient r

eferral to pain specialist





Miguel Angel Disclaimer:


Dragon Disclaimer:


This electronic medical record was generated, in whole or in part, using a voice

 recognition dictation system.





Departure


Departure


Impression:  


   Primary Impression:  


   Degenerative disc disease, lumbar


Disposition:   HOME / SELF CARE / HOMELESS


Condition:  IMPROVED


Referrals:  


NO PCP (PCP)


Please follow up with [facility] this week.





[address], [address]


[place] [address]





Phone number: [phone]


Patient Instructions:  Degenerative Disk Disease





Additional Instructions:  


Thank you for visiting our Emergency Department. We appreciate you trusting us 

with your care. If any additional problems come up don't hesitate to return to 

visit us. Please follow up with your primary care provider so they can plan 

additional care if needed and know about the problem that you had. If symptoms 

worsen come back to the Emergency Department. Any concerning symptoms that start

 such as chest pain, shortness of air, weakness or numbness on one side of the 

body, running high fevers or any other concerning symptoms return to the ER.


Scripts


Tramadol Hcl (TRAMADOL HCL) 50 Mg Tablet


50 MG PO Q6HRS PRN for PAIN, #10 TAB


   Prov: MEL BELCHER DO         22











MEL BELCHER DO                2022 02:07

## 2022-04-17 NOTE — RAD
CT lumbar spine without contrast. CT abdomen and pelvis with contrast.



PQRS statement: CT scans at this facility use dose reduction including either automated exposure cont
rol, iterative reconstructions, and /or weight based radiation dosing via mA and kV modification when
 appropriate to reduce radiation dose to as low as reasonably achievable.



HISTORY: Low back pain. Abdominal pain.



Contrast: 75 mL Omnipaque 300 intravenous contrast.



CT lumbar spine findings: Lumbar vertebral body height and alignment is intact. No fracture. No spond
ylolysis defect. Mild posterior disc space narrowing with shallow disc bulges at L4-L5 and L5-S1 ther
e may be a disc herniation at L5-S1, mild spinal canal stenosis is possible. Paraspinal tissues are n
ormal.



CT abdomen findings: Lung bases and bones are unremarkable. Hyperdense liver may be fatty. Mild promi
nence of the common bile duct typical after cholecystectomy. Pancreas, adrenals, kidneys and spleen a
re unremarkable. The pancreas is normal. No obstruction or inflammation of the GI tract. No abdominal
 fluid or adenopathy.



CT pelvis findings: Uterus, ovaries, bladder, rectum and bones are unremarkable. No pelvic fluid or a
denopathy.



IMPRESSION:

1. No acute osseous injury of the lumbar spine. Lower lumbar disc disease. See above.



2. No acute abnormality in the abdomen or pelvis. The appendix is normal.



Electronically signed by: Phi Mayes MD (4/17/2022 3:04 AM) [facility]